# Patient Record
Sex: FEMALE | Race: WHITE | Employment: FULL TIME | URBAN - METROPOLITAN AREA
[De-identification: names, ages, dates, MRNs, and addresses within clinical notes are randomized per-mention and may not be internally consistent; named-entity substitution may affect disease eponyms.]

---

## 2017-02-03 DIAGNOSIS — N95.1 SYMPTOMATIC MENOPAUSAL OR FEMALE CLIMACTERIC STATES: Primary | ICD-10-CM

## 2017-02-03 RX ORDER — ESTRADIOL 0.5 MG/1
TABLET ORAL
Qty: 30 TABLET | Refills: 0 | Status: SHIPPED | OUTPATIENT
Start: 2017-02-03 | End: 2017-05-31

## 2017-02-03 NOTE — TELEPHONE ENCOUNTER
Estradiol 0.5mg      Last Written Prescription Date:  11/4/16  Last Fill Quantity: 90,   # refills: 1  Last Office Visit with [facility] primary care provider:  6/19/15  Future Office visit: none    Pt overdue for annual, no appt scheduled One month extension sent.

## 2017-05-22 DIAGNOSIS — N95.1 SYMPTOMATIC MENOPAUSAL OR FEMALE CLIMACTERIC STATES: ICD-10-CM

## 2017-05-22 RX ORDER — ESTRADIOL 0.5 MG/1
TABLET ORAL
Qty: 180 TABLET | Refills: 0 | OUTPATIENT
Start: 2017-05-22

## 2017-05-22 NOTE — TELEPHONE ENCOUNTER
Estradiol 0.5mg      Last Written Prescription Date:  2/3/17  Last Fill Quantity: 30,   # refills: 0  Last Office Visit with [facility] primary care provider:  6/19/15  Future Office visit: none    Pt well overdue for annual, Rx denied.

## 2017-06-13 ENCOUNTER — RADIANT APPOINTMENT (OUTPATIENT)
Dept: MAMMOGRAPHY | Facility: CLINIC | Age: 57
End: 2017-06-13
Payer: COMMERCIAL

## 2017-06-13 ENCOUNTER — OFFICE VISIT (OUTPATIENT)
Dept: OBGYN | Facility: CLINIC | Age: 57
End: 2017-06-13
Payer: COMMERCIAL

## 2017-06-13 ENCOUNTER — RESULT FOLLOW UP (OUTPATIENT)
Dept: OBGYN | Facility: CLINIC | Age: 57
End: 2017-06-13

## 2017-06-13 VITALS
SYSTOLIC BLOOD PRESSURE: 118 MMHG | BODY MASS INDEX: 40.32 KG/M2 | DIASTOLIC BLOOD PRESSURE: 76 MMHG | WEIGHT: 242 LBS | HEART RATE: 72 BPM | HEIGHT: 65 IN

## 2017-06-13 DIAGNOSIS — N95.1 SYMPTOMATIC MENOPAUSAL OR FEMALE CLIMACTERIC STATES: ICD-10-CM

## 2017-06-13 DIAGNOSIS — R87.810 CERVICAL HIGH RISK HPV (HUMAN PAPILLOMAVIRUS) TEST POSITIVE: ICD-10-CM

## 2017-06-13 DIAGNOSIS — Z12.31 VISIT FOR SCREENING MAMMOGRAM: ICD-10-CM

## 2017-06-13 DIAGNOSIS — Z01.419 ENCOUNTER FOR GYNECOLOGICAL EXAMINATION WITHOUT ABNORMAL FINDING: Primary | ICD-10-CM

## 2017-06-13 DIAGNOSIS — Z11.51 SCREENING FOR HUMAN PAPILLOMAVIRUS: ICD-10-CM

## 2017-06-13 PROCEDURE — G0202 SCR MAMMO BI INCL CAD: HCPCS | Mod: TC

## 2017-06-13 PROCEDURE — 77063 BREAST TOMOSYNTHESIS BI: CPT | Mod: TC

## 2017-06-13 PROCEDURE — 87624 HPV HI-RISK TYP POOLED RSLT: CPT | Performed by: OBSTETRICS & GYNECOLOGY

## 2017-06-13 PROCEDURE — 99396 PREV VISIT EST AGE 40-64: CPT | Performed by: OBSTETRICS & GYNECOLOGY

## 2017-06-13 PROCEDURE — G0145 SCR C/V CYTO,THINLAYER,RESCR: HCPCS | Performed by: OBSTETRICS & GYNECOLOGY

## 2017-06-13 RX ORDER — ESTRADIOL 0.5 MG/1
TABLET ORAL
Qty: 180 TABLET | Refills: 3 | Status: SHIPPED | OUTPATIENT
Start: 2017-06-13 | End: 2018-06-11

## 2017-06-13 RX ORDER — LOSARTAN POTASSIUM 25 MG/1
50 TABLET ORAL
COMMUNITY
Start: 2017-02-13 | End: 2020-11-25

## 2017-06-13 ASSESSMENT — ANXIETY QUESTIONNAIRES
IF YOU CHECKED OFF ANY PROBLEMS ON THIS QUESTIONNAIRE, HOW DIFFICULT HAVE THESE PROBLEMS MADE IT FOR YOU TO DO YOUR WORK, TAKE CARE OF THINGS AT HOME, OR GET ALONG WITH OTHER PEOPLE: NOT DIFFICULT AT ALL
6. BECOMING EASILY ANNOYED OR IRRITABLE: NOT AT ALL
3. WORRYING TOO MUCH ABOUT DIFFERENT THINGS: NOT AT ALL
GAD7 TOTAL SCORE: 0
5. BEING SO RESTLESS THAT IT IS HARD TO SIT STILL: NOT AT ALL
1. FEELING NERVOUS, ANXIOUS, OR ON EDGE: NOT AT ALL
7. FEELING AFRAID AS IF SOMETHING AWFUL MIGHT HAPPEN: NOT AT ALL
2. NOT BEING ABLE TO STOP OR CONTROL WORRYING: NOT AT ALL

## 2017-06-13 ASSESSMENT — PATIENT HEALTH QUESTIONNAIRE - PHQ9: 5. POOR APPETITE OR OVEREATING: NOT AT ALL

## 2017-06-13 NOTE — LETTER
May 31, 2018      Anyi Jorje  [address]  [place] [address]    Dear ,      At [facility], your health and wellness is our primary concern. That is why we are following up on a positive high risk HPV test from 06/13/17. Your provider had recommended that you have a Pap smear and HPV test completed by 06/13/18. Our records do not show that this has been scheduled.    It is important to complete the follow up that your provider has suggested for you to ensure that there are no worsening changes which may, over time, develop into cancer.      Please contact our office at  [phone] to schedule an appointment for a Pap smear and HPV test at your earliest convenience. If you have questions or concerns, please call the clinic and we will be happy to assist you.    If you have completed the tests outside of [facility], please have the results forwarded to our office. We will update the chart for your primary Physician to review before your next annual physical.     Thank you for choosing [facility]!    Sincerely,      Augustus Stark MD/[facility]

## 2017-06-13 NOTE — LETTER
May 29, 2019      Anyi Recinos  [address]  [place] [address]    Dear ,      At [facility], your health and wellness is our primary concern. That is why we are following up on a positive high risk HPV test from 06/11/18. Your provider had recommended that you have a Pap smear and HPV test completed by 06/11/19. Our records do not show that this has been scheduled.    It is important to complete the follow up that your provider has suggested for you to ensure that there are no worsening changes which may, over time, develop into cancer.      Please contact our office at  [phone] to schedule an appointment for a Pap smear and HPV test at your earliest convenience. If you have questions or concerns, please call the clinic and we will be happy to assist you.    If you have completed the tests outside of [facility], please have the results forwarded to our office. We will update the chart for your primary Physician to review before your next annual physical.     Thank you for choosing [facility]!    Sincerely,      Your [facility] Care Team/[facility]

## 2017-06-13 NOTE — PROGRESS NOTES
Anyi is a 57 year old No obstetric history on file. female who presents for annual exam.     Besides routine health maintenance,  she would like refill on Estradiol.    HPI:  The patient's PCP is UMA BHAKTA.  Patient is seen for her annual exam.  She has no gynecologic concerns.  She's had some recent nosebleeds.  She is not taking any potential blood thinners.  Her blood pressure has been normal.      GYNECOLOGIC HISTORY:    Patient's last menstrual period was 09/17/2007.  Her current contraception method is: menopause.  She  reports that she quit smoking about 35 years ago. She has a 3.00 pack-year smoking history. She has never used smokeless tobacco.    Patient is sexually active.  STD testing offered?  Declined  Last PHQ-9 score on record =   PHQ-9 SCORE 6/13/2017   Total Score 2     Last GAD7 score on record =   HUEY-7 SCORE 6/13/2017   Total Score 0     Alcohol Score = 1    HEALTH MAINTENANCE:  Cholesterol: (No results found for: CHOL   Last Mammo: last record was 2014, Result: normal, Next Mammo: today   Pap: (  Lab Results   Component Value Date    PAP NIL 06/19/2015    )   Colonoscopy:  2013, Result: normal, Next Colonoscopy: 6 years.  Dexa:  NA    Health maintenance updated:  yes    HISTORY:  Obstetric History     No data available          Patient Active Problem List   Diagnosis     Prolapse of female pelvic organs     Cyst of left breast     Vaginal enterocele     Cystocele, midline     Routine general medical examination at a health care facility     Vaginal prolapse     Past Surgical History:   Procedure Laterality Date     COLPORRHAPHY ANTERIOR, POSTERIOR, COMBINED  9/17/2013    Procedure: COMBINED COLPORRHAPHY ANTERIOR, POSTERIOR;  ANTERIOR AND POSTERIOR REPAIR, PERINEOPLASTY, MINI ARC SLING (DR. HOBSON), DIAGNOSTIC LAPAROSCOPY AND LAPAROSCOPIC CHOLECYSTECTOMY (DR. ANDREW) ;  Surgeon: Augustus Hobson MD;  Location:  OR     COSMETIC SURGERY  2013    abdominoplasty     [facility]  SACROCOLPOPEXY, MIDURETHRAL SLING, CYSTOSCOPY N/A 5/11/2016    Procedure: DAVINCI SACROCOLPOPEXY, MIDURETHRAL SLING, CYSTOSCOPY;  Surgeon: Don Abdullahi MD;  Location:  OR     GI SURGERY  2007    lap band     GYN SURGERY  2007    vag hysterectomy     GYN SURGERY  1988,1990    c section     LAPAROSCOPIC CHOLECYSTECTOMY  9/17/2013    Procedure: LAPAROSCOPIC CHOLECYSTECTOMY;;  Surgeon: Mckinley Liu MD;  Location:  OR     LAPAROSCOPY DIAGNOSTIC (GENERAL)  9/17/2013    Procedure: LAPAROSCOPY DIAGNOSTIC (GENERAL);;  Surgeon: Mckinley Liu MD;  Location:  OR     LAPAROSCOPY, SURGICAL; REPAIR INCISIONAL OR VENTRAL HERNIA       MAMMOPLASTY REDUCTION BILATERAL       MASTOPEXY BILATERAL       SLING TRANSVAGINAL  9/17/2013    Procedure: SLING TRANSVAGINAL;;  Surgeon: Augustus Stark MD;  Location:  OR      Social History   Substance Use Topics     Smoking status: Former Smoker     Packs/day: 0.50     Years: 6.00     Quit date: 1/12/1982     Smokeless tobacco: Never Used     Alcohol use Yes      Comment: rare      Problem (# of Occurrences) Relation (Name,Age of Onset)    GASTROINTESTINAL DISEASE (1) Father    HEART DISEASE (1) Father            Current Outpatient Prescriptions   Medication Sig     losartan (COZAAR) 25 MG tablet Take 25 mg by mouth     estradiol (ESTRACE) 0.5 MG tablet TAKE 2 TABLETS BY MOUTH EVERY EVENING.     Probiotic Product (PROBIOTIC PO) Take 1 tablet by mouth daily     [DISCONTINUED] estradiol (ESTRACE) 0.5 MG tablet TAKE 2 TABLETS BY MOUTH EVERY EVENING.     No current facility-administered medications for this visit.      No Known Allergies    Past medical, surgical, social and family histories were reviewed and updated in EPIC.    ROS:   12 point review of systems negative other than symptoms noted below.  Constitutional: Fatigue  Eyes: Spots  Cardiovascular: Lower Extremity Swelling  Gastrointestinal: Bloating and Heartburn  Musculoskeletal: Joint  "Pain  Endocrine: Loss of Hair  Blood/Lymph: Nose Bleeds    EXAM:  /76  Pulse 72  Ht 5' 5\" (1.651 m)  Wt 242 lb (109.8 kg)  LMP 09/17/2007  Breastfeeding? No  BMI 40.27 kg/m2   BMI: Body mass index is 40.27 kg/(m^2).    PHYSICAL EXAM:  Constitutional:  Appearance: Well nourished, well developed, alert, in no acute distress  Neck:  Lymph Nodes:  No lymphadenopathy present    Thyroid:  Gland size normal, nontender, no nodules or masses present  on palpation  Chest:  Respiratory Effort:  Breathing unlabored  Cardiovascular:    Heart: Auscultation:  Regular rate, normal rhythm, no murmurs present  Breasts: Inspection of Breasts:  No lymphadenopathy present    Palpation of Breasts and Axillae:  No masses present on palpation, no  breast tenderness    Axillary Lymph Nodes:  No lymphadenopathy present  Gastrointestinal:   Abdominal Examination:  Abdomen nontender to palpation, tone normal without rigidity or guarding, no masses present, umbilicus without lesions   Liver and Spleen:  No hepatomegaly present, liver nontender to palpation    Hernias:  No hernias present  Lymphatic: Lymph Nodes:  No other lymphadenopathy present  Skin:  General Inspection:  No rashes present, no lesions present, no areas of  discoloration    Genitalia and Groin:  No rashes present, no lesions present, no areas of  discoloration, no masses present  Neurologic/Psychiatric:    Mental Status:  Oriented X3     Pelvic Exam:  External Genitalia:     Normal appearance for age, no discharge present, no tenderness present, no inflammatory lesions present, color normal  Vagina:     Normal vaginal vault without central or paravaginal defects, no discharge present, no inflammatory lesions present, no masses present  Bladder:     Nontender to palpation  Urethra:   Urethral Body:  Urethra palpation normal, urethra structural support normal   Urethral Meatus:  No erythema or lesions present  Cervix:     Surgically absent  Uterus:     Surgically " absent  Adnexa:     Surgically absent  Perineum:     Perineum within normal limits, no evidence of trauma, no rashes or skin lesions present  Anus:     Anus within normal limits, no hemorrhoids present  Inguinal Lymph Nodes:     No lymphadenopathy present    COUNSELING:   Reviewed preventive health counseling, as reflected in patient instructions       Regular exercise       Healthy diet/nutrition    BMI: Body mass index is 40.27 kg/(m^2).  Weight management plan: Patient was referred to their PCP to discuss a diet and exercise plan.    ASSESSMENT:  57 year old female with satisfactory annual exam.    ICD-10-CM    1. Encounter for gynecological examination without abnormal finding Z01.419 Pap imaged thin layer screen reflex to HPV if ASCUS - recommended age 25 - 29 years   2. Symptomatic menopausal or female climacteric states N95.1 estradiol (ESTRACE) 0.5 MG tablet       PLAN:  The patient will return to clinic in 1 year or as needed.    Augustus Stark MD

## 2017-06-13 NOTE — LETTER
June 21, 2017      Anyi Arellanoner  [address] [place] [address]    Dear ner,      This letter is in regards to the PAP smear and HPV (Human Papillomavirus) test you had done recently. Your PAP test result is normal, but your HPV (Human Papillomavirus) test was positive.     About 80 percent of women have been exposed to HPV virus throughout their lifetime. There is no medication for the treatment of HPV. Typically your own immune system gets rid of the virus before it does harm. HPV is spread by direct skin-to-skin contact, including sexual intercourse, oral sex, anal sex, or any other contact involving the genital area (example: hand to genital contact). It is not possible to become infected with HPV by touching an object, such as a toilet seat. Most people who are infected with HPV have no signs or symptoms.    Things that you can do to boost your immune system and help your body get rid of HPV: get plenty of rest, eat a well-balanced diet of healthy foods, and stop smoking.     Please return in 1 year to repeat your pap smear and HPV test.     If you have additional questions regarding this result, please call [phone].    Sincerely,      Augustus Stark MD/[facility]

## 2017-06-13 NOTE — MR AVS SNAPSHOT
"              After Visit Summary   2017    Anyi Recinos    MRN: [id number]           Patient Information     Date Of Birth          1960        Visit Information        Provider Department      2017 1:15 PM Augustus Stark MD [facility]        Today's Diagnoses     Encounter for gynecological examination without abnormal finding    -  1    Symptomatic menopausal or female climacteric states           Follow-ups after your visit        Who to contact     If you have questions or need follow up information about today's clinic visit or your schedule please contact [facility] directly at [phone].  Normal or non-critical lab and imaging results will be communicated to you by MyChart, letter or phone within 4 business days after the clinic has received the results. If you do not hear from us within 7 days, please contact the clinic through [employer] or phone. If you have a critical or abnormal lab result, we will notify you by phone as soon as possible.  Submit refill requests through [employer] or call your pharmacy and they will forward the refill request to us. Please allow 3 business days for your refill to be completed.          Additional Information About Your Visit        MyChart Information     [employer] lets you send messages to your doctor, view your test results, renew your prescriptions, schedule appointments and more. To sign up, go to [email or web address] . Click on \"Log in\" on the left side of the screen, which will take you to the Welcome page. Then click on \"Sign up Now\" on the right side of the page.     You will be asked to enter the access code listed below, as well as some personal information. Please follow the directions to create your username and password.     Your access code is: [email or web address]  Expires: 2017  1:50 PM     Your access code will  in 90 days. If you need help or a new code, please call your [facility] clinic " "or [phone].        Care EveryWhere ID     This is your Care EveryWhere ID. This could be used by other organizations to access your [facility] medical records  [id number]        Your Vitals Were     Pulse Height Last Period Breastfeeding? BMI (Body Mass Index)       72 5' 5\" (1.651 m) 09/17/2007 No 40.27 kg/m2        Blood Pressure from Last 3 Encounters:   06/13/17 118/76   12/13/16 128/80   05/12/16 112/59    Weight from Last 3 Encounters:   06/13/17 242 lb (109.8 kg)   12/13/16 242 lb 9.6 oz (110 kg)   05/11/16 238 lb 11.2 oz (108.3 kg)              We Performed the Following     Pap imaged thin layer screen reflex to HPV if ASCUS - recommended age 25 - 29 years          Today's Medication Changes          These changes are accurate as of: 6/13/17  1:51 PM.  If you have any questions, ask your nurse or doctor.               These medicines have changed or have updated prescriptions.        Dose/Directions    estradiol 0.5 MG tablet   Commonly known as:  ESTRACE   This may have changed:  See the new instructions.   Used for:  Symptomatic menopausal or female climacteric states   Changed by:  Augustus Stark MD        TAKE 2 TABLETS BY MOUTH EVERY EVENING.   Quantity:  180 tablet   Refills:  3            Where to get your medicines      These medications were sent to [id number] [place] [facility] [place] [place] - [address] [facility] [address]  [address] [address] [place] [address]     Phone:  [phone]     estradiol 0.5 MG tablet                Primary Care Provider Office Phone # Fax #    Benedict ANA MARÍA Lenz [phone]       [facility] [address]  [place] [address]        Thank you!     Thank you for choosing [facility]  for your care. Our goal is always to provide you with excellent care. Hearing back from our patients is one way we can continue to improve our services. Please take a few " minutes to complete the written survey that you may receive in the mail after your visit with us. Thank you!             Your Updated Medication List - Protect others around you: Learn how to safely use, store and throw away your medicines at www.disposemymeds.org.          This list is accurate as of: 6/13/17  1:51 PM.  Always use your most recent med list.                   Brand Name Dispense Instructions for use    estradiol 0.5 MG tablet    ESTRACE    180 tablet    TAKE 2 TABLETS BY MOUTH EVERY EVENING.       losartan 25 MG tablet    COZAAR     Take 25 mg by mouth       PROBIOTIC PO      Take 1 tablet by mouth daily

## 2017-06-14 ASSESSMENT — PATIENT HEALTH QUESTIONNAIRE - PHQ9: SUM OF ALL RESPONSES TO PHQ QUESTIONS 1-9: 2

## 2017-06-14 ASSESSMENT — ANXIETY QUESTIONNAIRES: GAD7 TOTAL SCORE: 0

## 2017-06-15 LAB
COPATH REPORT: NORMAL
PAP: NORMAL

## 2017-06-19 LAB
FINAL DIAGNOSIS: ABNORMAL
HPV HR 12 DNA CVX QL NAA+PROBE: POSITIVE
HPV16 DNA SPEC QL NAA+PROBE: NEGATIVE
HPV18 DNA SPEC QL NAA+PROBE: NEGATIVE
SPECIMEN DESCRIPTION: ABNORMAL

## 2017-06-20 PROBLEM — R87.810 CERVICAL HIGH RISK HPV (HUMAN PAPILLOMAVIRUS) TEST POSITIVE: Status: ACTIVE | Noted: 2017-06-13

## 2017-06-20 NOTE — PROGRESS NOTES
06/13/17: NIL vag pap, + HR HPV (not 16 or 18) result. Plan provider review. Pt had a hysterectomy. Plan for 1 yr pap, due 6/2018 (LN)  6/21/17 Result letter sent per RN request (rlm)  05/31/18 Pap reminder letter sent. ([facility])  6/11/18 NIL vaginal pap/+ HR HPV (not 16 or 18). Plan: cotest in 1 year  05/29/19 Cotest reminder letter sent. ([facility])  07/05/19 OV notes: Long discussion about HPV. Pt technically would not have pap of vagina after hysterectomy. Now with pos HPV but 3 negative paps. Discussed low risk of vaginal cancer. Possible resolution of HPV over time. Will have pap and HPV repeated in 3-4 years. If neg for HPV will stop all paps. ([facility])

## 2018-06-11 ENCOUNTER — RADIANT APPOINTMENT (OUTPATIENT)
Dept: MAMMOGRAPHY | Facility: CLINIC | Age: 58
End: 2018-06-11
Payer: COMMERCIAL

## 2018-06-11 ENCOUNTER — OFFICE VISIT (OUTPATIENT)
Dept: OBGYN | Facility: CLINIC | Age: 58
End: 2018-06-11
Payer: COMMERCIAL

## 2018-06-11 VITALS
SYSTOLIC BLOOD PRESSURE: 114 MMHG | HEART RATE: 68 BPM | WEIGHT: 245 LBS | HEIGHT: 65 IN | DIASTOLIC BLOOD PRESSURE: 68 MMHG | BODY MASS INDEX: 40.82 KG/M2

## 2018-06-11 DIAGNOSIS — Z12.31 VISIT FOR SCREENING MAMMOGRAM: ICD-10-CM

## 2018-06-11 DIAGNOSIS — N81.6 RECTOCELE: ICD-10-CM

## 2018-06-11 DIAGNOSIS — N95.1 SYMPTOMATIC MENOPAUSAL OR FEMALE CLIMACTERIC STATES: ICD-10-CM

## 2018-06-11 DIAGNOSIS — B97.7 HIGH RISK HPV INFECTION: ICD-10-CM

## 2018-06-11 DIAGNOSIS — Z01.419 ENCOUNTER FOR GYNECOLOGICAL EXAMINATION WITHOUT ABNORMAL FINDING: Primary | ICD-10-CM

## 2018-06-11 PROCEDURE — 99396 PREV VISIT EST AGE 40-64: CPT | Performed by: OBSTETRICS & GYNECOLOGY

## 2018-06-11 PROCEDURE — 87624 HPV HI-RISK TYP POOLED RSLT: CPT | Performed by: OBSTETRICS & GYNECOLOGY

## 2018-06-11 PROCEDURE — 88175 CYTOPATH C/V AUTO FLUID REDO: CPT | Performed by: OBSTETRICS & GYNECOLOGY

## 2018-06-11 PROCEDURE — 77067 SCR MAMMO BI INCL CAD: CPT | Mod: TC

## 2018-06-11 PROCEDURE — 77063 BREAST TOMOSYNTHESIS BI: CPT | Mod: TC

## 2018-06-11 RX ORDER — ESTRADIOL 0.5 MG/1
TABLET ORAL
Qty: 180 TABLET | Refills: 3 | Status: SHIPPED | OUTPATIENT
Start: 2018-06-11 | End: 2019-06-19

## 2018-06-11 ASSESSMENT — ANXIETY QUESTIONNAIRES
2. NOT BEING ABLE TO STOP OR CONTROL WORRYING: NOT AT ALL
3. WORRYING TOO MUCH ABOUT DIFFERENT THINGS: NOT AT ALL
6. BECOMING EASILY ANNOYED OR IRRITABLE: NOT AT ALL
1. FEELING NERVOUS, ANXIOUS, OR ON EDGE: NOT AT ALL
5. BEING SO RESTLESS THAT IT IS HARD TO SIT STILL: NOT AT ALL
IF YOU CHECKED OFF ANY PROBLEMS ON THIS QUESTIONNAIRE, HOW DIFFICULT HAVE THESE PROBLEMS MADE IT FOR YOU TO DO YOUR WORK, TAKE CARE OF THINGS AT HOME, OR GET ALONG WITH OTHER PEOPLE: NOT DIFFICULT AT ALL

## 2018-06-11 ASSESSMENT — PATIENT HEALTH QUESTIONNAIRE - PHQ9: 5. POOR APPETITE OR OVEREATING: NOT AT ALL

## 2018-06-11 NOTE — PROGRESS NOTES
Anyi is a 58 year old No obstetric history on file. female who presents for annual exam.     Besides routine health maintenance, she has no other health concerns today .    HPI:  The patient's PCP is  UMA BHAKTA.      Patient is seen for her annual exam.  She has some questions about her positive HPV viral Pap smear last year.  It was positive for other HPV viruses not 16 or 18.      GYNECOLOGIC HISTORY:    Patient's last menstrual period was 09/17/2007.  Her current contraception method is: hysterectomy.  She  reports that she quit smoking about 36 years ago. She has a 3.00 pack-year smoking history. She has never used smokeless tobacco.    Patient is sexually active.  STD testing offered?  Declined  Last PHQ-9 score on record =   PHQ-9 SCORE 6/11/2018   Total Score 1     Last GAD7 score on record =   HUEY-7 SCORE 6/13/2017   Total Score 0     Alcohol Score = 1 or 2    HEALTH MAINTENANCE:  Cholesterol: (No results found for: CHOL   Last Mammo: one year ago, Result: normal, Next Mammo: today  Pap: 6/13/17 NIL HPV OTHER HR POSITIVE   Lab Results   Component Value Date    PAP NIL 06/13/2017    PAP NIL 06/19/2015      Colonoscopy:  2006*, Result: normal, Next Colonoscopy: 10years.  Dexa:  Never     Health maintenance updated:  yes    HISTORY:  Obstetric History     No data available          Patient Active Problem List   Diagnosis     Prolapse of female pelvic organs     Cyst of left breast     Vaginal enterocele     Cystocele, midline     Routine general medical examination at a health care facility     Vaginal prolapse     Cervical high risk HPV (human papillomavirus) test positive     Past Surgical History:   Procedure Laterality Date     COLPORRHAPHY ANTERIOR, POSTERIOR, COMBINED  9/17/2013    Procedure: COMBINED COLPORRHAPHY ANTERIOR, POSTERIOR;  ANTERIOR AND POSTERIOR REPAIR, PERINEOPLASTY, MINI ARC SLING (DR. HOBSON), DIAGNOSTIC LAPAROSCOPY AND LAPAROSCOPIC CHOLECYSTECTOMY (DR. ANDREW) ;  Surgeon:  Augustus Stark MD;  Location:  OR     COSMETIC SURGERY  2013    abdominoplasty     DAVINCI SACROCOLPOPEXY, MIDURETHRAL SLING, CYSTOSCOPY N/A 5/11/2016    Procedure: DAVINCI SACROCOLPOPEXY, MIDURETHRAL SLING, CYSTOSCOPY;  Surgeon: Don Abdullahi MD;  Location:  OR     GI SURGERY  2007    lap band     GYN SURGERY  2007    vag hysterectomy     GYN SURGERY  1988,1990    c section     LAPAROSCOPIC CHOLECYSTECTOMY  9/17/2013    Procedure: LAPAROSCOPIC CHOLECYSTECTOMY;;  Surgeon: Mckinley Liu MD;  Location:  OR     LAPAROSCOPY DIAGNOSTIC (GENERAL)  9/17/2013    Procedure: LAPAROSCOPY DIAGNOSTIC (GENERAL);;  Surgeon: Mckinley Liu MD;  Location:  OR     LAPAROSCOPY, SURGICAL; REPAIR INCISIONAL OR VENTRAL HERNIA       MAMMOPLASTY REDUCTION BILATERAL       MASTOPEXY BILATERAL       SLING TRANSVAGINAL  9/17/2013    Procedure: SLING TRANSVAGINAL;;  Surgeon: Augustus Stark MD;  Location:  OR      Social History   Substance Use Topics     Smoking status: Former Smoker     Packs/day: 0.50     Years: 6.00     Quit date: 1/12/1982     Smokeless tobacco: Never Used     Alcohol use Yes      Comment: rare      Problem (# of Occurrences) Relation (Name,Age of Onset)    GASTROINTESTINAL DISEASE (1) Father    HEART DISEASE (1) Father            Current Outpatient Prescriptions   Medication Sig     estradiol (ESTRACE) 0.5 MG tablet TAKE 2 TABLETS BY MOUTH EVERY EVENING.     losartan (COZAAR) 25 MG tablet Take 25 mg by mouth     Probiotic Product (PROBIOTIC PO) Take 1 tablet by mouth daily     [DISCONTINUED] estradiol (ESTRACE) 0.5 MG tablet TAKE 2 TABLETS BY MOUTH EVERY EVENING.     No current facility-administered medications for this visit.      No Known Allergies    Past medical, surgical, social and family histories were reviewed and updated in EPIC.    ROS:   12 point review of systems negative other than symptoms noted below.  Constitutional: Fatigue and Weight Gain  Eyes:  "Spots  Cardiovascular: Lightheadedness  Gastrointestinal: Bloating and Heartburn  Skin: Skin Dryness  Musculoskeletal: Joint Pain  Blood/Lymph: Lymph Node Enlargement    EXAM:  /68  Pulse 68  Ht 5' 5\" (1.651 m)  Wt 245 lb (111.1 kg)  LMP 09/17/2007  BMI 40.77 kg/m2   BMI: Body mass index is 40.77 kg/(m^2).    PHYSICAL EXAM:  Constitutional:  Appearance: Well nourished, well developed, alert, in no acute distress  Neck:  Lymph Nodes:  No lymphadenopathy present    Thyroid:  Gland size normal, nontender, no nodules or masses present  on palpation  Chest:  Respiratory Effort:  Breathing unlabored  Cardiovascular:    Heart: Auscultation:  Regular rate, normal rhythm, no murmurs present  Breasts: Inspection of Breasts:  No lymphadenopathy present., Palpation of Breasts and Axillae:  No masses present on palpation, no breast tenderness., Axillary Lymph Nodes:  No lymphadenopathy present. and No nodularity, asymmetry or nipple discharge bilaterally.  Gastrointestinal:   Abdominal Examination:  Abdomen nontender to palpation, tone normal without rigidity or guarding, no masses present, umbilicus without lesions   Liver and Spleen:  No hepatomegaly present, liver nontender to palpation    Hernias:  No hernias present  Lymphatic: Lymph Nodes:  No other lymphadenopathy present  Skin:  General Inspection:  No rashes present, no lesions present, no areas of  discoloration    Genitalia and Groin:  No rashes present, no lesions present, no areas of  discoloration, no masses present  Neurologic/Psychiatric:    Mental Status:  Oriented X3     Pelvic Exam:  External Genitalia:     Normal appearance for age, no discharge present, no tenderness present, no inflammatory lesions present, color normal  Vagina:     Grade 1-2 rectocele good apical support  Bladder:     Nontender to palpation  Urethra:   Urethral Body:  Urethra palpation normal, urethra structural support normal   Urethral Meatus:  No erythema or lesions " present  Cervix:     Surgically absent  Uterus:     Surgically absent  Adnexa:     No adnexal tenderness present, no adnexal masses present  Perineum:     Perineum within normal limits, no evidence of trauma, no rashes or skin lesions present  Anus:     Anus within normal limits, no hemorrhoids present  Inguinal Lymph Nodes:     No lymphadenopathy present  Pubic Hair:     Normal pubic hair distribution for age  Genitalia and Groin:     No rashes present, no lesions present, no areas of discoloration, no masses present      COUNSELING:   Reviewed preventive health counseling, as reflected in patient instructions       Regular exercise       Healthy diet/nutrition    BMI: Body mass index is 40.77 kg/(m^2).  Weight management plan: Patient was referred to their PCP to discuss a diet and exercise plan.    ASSESSMENT:  58 year old female with satisfactory annual exam.    ICD-10-CM    1. Encounter for gynecological examination without abnormal finding Z01.419 Pap imaged thin layer screen with HPV - recommended age 30 - 65     HPV High Risk Types DNA Cervical   2. Symptomatic menopausal or female climacteric states N95.1 estradiol (ESTRACE) 0.5 MG tablet   3. Rectocele N81.6    4. High risk HPV infection B97.7     Not 16 or 18       PLAN:  Patient be notified as to her Pap results and HPV status.  If her Pap is normal and her HPV is again positive we will repeat her Pap smear and CLOtest in 1 year.  If her Pap is atypical we will schedule colposcopy.    Augustus Stark MD

## 2018-06-11 NOTE — LETTER
June 18, 2018      Anyi Arellanoner  [address] [facility] [place] [address]    Dear ner,      This letter is in regards to the PAP smear and HPV (Human Papillomavirus) test you had done recently. Your PAP test result is normal, but your HPV (Human Papillomavirus) test was positive.     About 80 percent of women have been exposed to HPV virus throughout their lifetime. There is no medication for the treatment of HPV. Typically your own immune system gets rid of the virus before it does harm. HPV is spread by direct skin-to-skin contact, including sexual intercourse, oral sex, anal sex, or any other contact involving the genital area (example: hand to genital contact). It is not possible to become infected with HPV by touching an object, such as a toilet seat. Most people who are infected with HPV have no signs or symptoms.    Things that you can do to boost your immune system and help your body get rid of HPV: get plenty of rest, eat a well-balanced diet of healthy foods, and stop smoking.     Please return in 1 year to repeat your pap smear and HPV test.     If you have additional questions regarding this result, please call [phone].    Sincerely,      Augustus Stark MD/emily

## 2018-06-11 NOTE — MR AVS SNAPSHOT
"              After Visit Summary   2018    Anyi Recinos    MRN: [id number]           Patient Information     Date Of Birth          1960        Visit Information        Provider Department      2018 1:30 PM Augustus Stark MD [facility]        Today's Diagnoses     Encounter for gynecological examination without abnormal finding    -  1    Symptomatic menopausal or female climacteric states        Rectocele        High risk HPV infection           Follow-ups after your visit        Who to contact     If you have questions or need follow up information about today's clinic visit or your schedule please contact [facility] directly at [phone].  Normal or non-critical lab and imaging results will be communicated to you by MyChart, letter or phone within 4 business days after the clinic has received the results. If you do not hear from us within 7 days, please contact the clinic through Pigeonly[facility] or phone. If you have a critical or abnormal lab result, we will notify you by phone as soon as possible.  Submit refill requests through 8digits or call your pharmacy and they will forward the refill request to us. Please allow 3 business days for your refill to be completed.          Additional Information About Your Visit        MyChart Information     8digits lets you send messages to your doctor, view your test results, renew your prescriptions, schedule appointments and more. To sign up, go to [email or web address] . Click on \"Log in\" on the left side of the screen, which will take you to the Welcome page. Then click on \"Sign up Now\" on the right side of the page.     You will be asked to enter the access code listed below, as well as some personal information. Please follow the directions to create your username and password.     Your access code is: [email or web address]  Expires: 2018  1:05 PM     Your access code will  in 90 days. If you need help " "or a new code, please call your [facility] clinic or [phone].        Care EveryWhere ID     This is your Care EveryWhere ID. This could be used by other organizations to access your [facility] medical records  [id number]        Your Vitals Were     Pulse Height Last Period BMI (Body Mass Index)          68 5' 5\" (1.651 m) 09/17/2007 40.77 kg/m2         Blood Pressure from Last 3 Encounters:   06/11/18 114/68   06/13/17 118/76   12/13/16 128/80    Weight from Last 3 Encounters:   06/11/18 245 lb (111.1 kg)   06/13/17 242 lb (109.8 kg)   12/13/16 242 lb 9.6 oz (110 kg)              We Performed the Following     HPV High Risk Types DNA Cervical     Pap imaged thin layer screen with HPV - recommended age 30 - 65          Where to get your medicines      These medications were sent to [facility] - [place], [place] - [address] AT [facility] OF [facility] [address]  [address] [facility], [facility] [address]     Phone:  [phone]     estradiol 0.5 MG tablet          Primary Care Provider Office Phone # Fax #    Benedict Lenz [phone]       [facility] [address]  [facility] [address]        Equal Access to Services     BHAVESH HYLTON AH: Hadii aad ku hadasho Soomaali, waaxda luqadaha, qaybta kaalmada adeegyada, luciano huerta. So [facility] [phone].    ATENCIÓN: Si habla español, tiene a haddad disposición servicios gratuitos de asistencia lingüística. Ariella gomez [phone].    We comply with applicable federal civil rights laws and [place] laws. We do not discriminate on the basis of race, color, national origin, age, disability, sex, sexual orientation, or gender identity.            Thank you!     Thank you for choosing [facility]  for your care. Our goal is always to provide you with excellent care. Hearing back from our patients is one way we can continue to improve our services. Please " take a few minutes to complete the written survey that you may receive in the mail after your visit with us. Thank you!             Your Updated Medication List - Protect others around you: Learn how to safely use, store and throw away your medicines at www.disposemymeds.org.          This list is accurate as of 6/11/18  2:05 PM.  Always use your most recent med list.                   Brand Name Dispense Instructions for use Diagnosis    estradiol 0.5 MG tablet    ESTRACE    180 tablet    TAKE 2 TABLETS BY MOUTH EVERY EVENING.    Symptomatic menopausal or female climacteric states       losartan 25 MG tablet    COZAAR     Take 25 mg by mouth        PROBIOTIC PO      Take 1 tablet by mouth daily

## 2018-06-12 ASSESSMENT — PATIENT HEALTH QUESTIONNAIRE - PHQ9: SUM OF ALL RESPONSES TO PHQ QUESTIONS 1-9: 1

## 2018-06-13 LAB
COPATH REPORT: NORMAL
PAP: NORMAL

## 2018-06-15 PROBLEM — R87.811 VAGINAL HIGH RISK HUMAN PAPILLOMAVIRUS (HPV) DNA TEST POSITIVE: Status: ACTIVE | Noted: 2017-06-13

## 2018-06-15 LAB
FINAL DIAGNOSIS: ABNORMAL
HPV HR 12 DNA CVX QL NAA+PROBE: POSITIVE
HPV16 DNA SPEC QL NAA+PROBE: NEGATIVE
HPV18 DNA SPEC QL NAA+PROBE: NEGATIVE
SPECIMEN DESCRIPTION: ABNORMAL
SPECIMEN SOURCE CVX/VAG CYTO: ABNORMAL

## 2019-06-19 DIAGNOSIS — N95.1 SYMPTOMATIC MENOPAUSAL OR FEMALE CLIMACTERIC STATES: ICD-10-CM

## 2019-06-19 RX ORDER — ESTRADIOL 0.5 MG/1
TABLET ORAL
Qty: 60 TABLET | Refills: 0 | Status: SHIPPED | OUTPATIENT
Start: 2019-06-19 | End: 2019-06-19

## 2019-06-19 RX ORDER — ESTRADIOL 0.5 MG/1
TABLET ORAL
Qty: 180 TABLET | Refills: 0 | Status: SHIPPED | OUTPATIENT
Start: 2019-06-19 | End: 2019-07-05

## 2019-06-19 NOTE — TELEPHONE ENCOUNTER
"Requested Prescriptions   Pending Prescriptions Disp Refills     estradiol (ESTRACE) 0.5 MG tablet [Pharmacy Med Name: ESTRADIOL 0.5MG TABLETS] 180 tablet 0     Sig: TAKE 2 TABLETS BY MOUTH EVERY EVENING       Hormone Replacement Therapy Failed - 6/19/2019 11:55 AM        Failed - Blood pressure under 140/90 in past 12 months     BP Readings from Last 3 Encounters:   06/11/18 114/68   06/13/17 118/76   12/13/16 128/80                 Passed - Recent (12 mo) or future (30 days) visit within the authorizing provider's specialty     Patient had office visit in the last 12 months or has a visit in the next 30 days with authorizing provider or within the authorizing provider's specialty.  See \"Patient Info\" tab in inbasket, or \"Choose Columns\" in Meds & Orders section of the refill encounter.              Passed - Patient has mammogram in past 2 years on file if age 50-75        Passed - Medication is active on med list        Passed - Patient is 18 years of age or older        Passed - No active pregnancy on record        Passed - No positive pregnancy test on record in past 12 months        Prescription approved per [facility] Refill Protocol.  Next 5 appointments (look out 90 days)    Jul 05, 2019 11:40 AM CDT  PHYSICAL with José Peoples MD  [facility] ([facility]) [address] [place] [id number]  [phone]        Zarina Restrepo RN on 6/19/2019 at 12:13 PM    "

## 2019-06-19 NOTE — TELEPHONE ENCOUNTER
"Requested Prescriptions   Pending Prescriptions Disp Refills     estradiol (ESTRACE) 0.5 MG tablet [Pharmacy Med Name: ESTRADIOL 0.5MG TABLETS] 180 tablet 0     Sig: TAKE 2 TABLETS BY MOUTH EVERY EVENING.       Hormone Replacement Therapy Failed - 6/19/2019  1:00 PM        Failed - Blood pressure under 140/90 in past 12 months     BP Readings from Last 3 Encounters:   06/11/18 114/68   06/13/17 118/76   12/13/16 128/80                 Passed - Recent (12 mo) or future (30 days) visit within the authorizing provider's specialty     Patient had office visit in the last 12 months or has a visit in the next 30 days with authorizing provider or within the authorizing provider's specialty.  See \"Patient Info\" tab in inbasket, or \"Choose Columns\" in Meds & Orders section of the refill encounter.              Passed - Patient has mammogram in past 2 years on file if age 50-75        Passed - Medication is active on med list        Passed - Patient is 18 years of age or older        Passed - No active pregnancy on record        Passed - No positive pregnancy test on record in past 12 months        90 day supply sent  Zarina Restrepo RN on 6/19/2019 at 2:11 PM    "

## 2019-07-02 NOTE — PROGRESS NOTES
Anyi is a 59 year old No obstetric history on file. female who presents for annual exam.     Besides routine health maintenance,  she would like to discuss previous pap results.    HPI:  The patient's PCP is  UMA BHAKTA.    Pt usually sees [facility]. Hx of TVH for non cervical reasons.   Continued to have vaginal pap smears. Last 3 have been normal. Last 2 have positive HPV (non 16/18)  No vaginal bleeding.    Has elevated blood sugar. Lost weight and labs normal.    GYNECOLOGIC HISTORY:    Patient's last menstrual period was 09/17/2007.  Her current contraception method is: hysterectomy.  She  reports that she quit smoking about 37 years ago. She has a 3.00 pack-year smoking history. She has never used smokeless tobacco.    Patient is sexually active.  STD testing offered?  Declined  Last PHQ-9 score on record =   PHQ-9 SCORE 7/5/2019   PHQ-9 Total Score 3     Last GAD7 score on record =   HUEY-7 SCORE 7/5/2019   Total Score 2     Alcohol Score = 1    HEALTH MAINTENANCE:  Cholesterol: 12/14/16 @ [facility]   Total= 146, Triglycerides=63, HDL=53, LDL=80, FBS=N/A, TSH=2.29 (7/22/13)    Last Mammo: 6/11/18, Result: normal, Next Mammo: will so at breast center today while she is here     Pap: 6/11/18 NIL, HPV HR +  Lab Results   Component Value Date    PAP NIL 06/11/2018    PAP NIL 06/13/2017    PAP NIL 06/19/2015      Colonoscopy:  Within the last 10 years, Result: normal, Next Colonoscopy: states she has a couple years till due years.  Dexa:  N/A    Health maintenance updated:  no, Pap, Mammogram, Colonoscopy due    HISTORY:  OB History   No data available       Patient Active Problem List   Diagnosis     Prolapse of female pelvic organs     Cyst of left breast     Vaginal enterocele     Cystocele, midline     Routine general medical examination at a health care facility     Vaginal prolapse     Vaginal high risk human papillomavirus (HPV) DNA test positive     Past Surgical History:   Procedure  Laterality Date     COLPORRHAPHY ANTERIOR, POSTERIOR, COMBINED  2013    Procedure: COMBINED COLPORRHAPHY ANTERIOR, POSTERIOR;  ANTERIOR AND POSTERIOR REPAIR, PERINEOPLASTY, MINI ARC SLING (DR. STARK), DIAGNOSTIC LAPAROSCOPY AND LAPAROSCOPIC CHOLECYSTECTOMY (DR. LIU) ;  Surgeon: Augustus Stark MD;  Location:  OR     COSMETIC SURGERY      abdominoplasty     DAVINCI SACROCOLPOPEXY, MIDURETHRAL SLING, CYSTOSCOPY N/A 2016    Procedure: DAVINCI SACROCOLPOPEXY, MIDURETHRAL SLING, CYSTOSCOPY;  Surgeon: Don Abdullahi MD;  Location:  OR     GI SURGERY      lap band     GYN SURGERY      vag hysterectomy     GYN SURGERY  ,    c section     LAPAROSCOPIC CHOLECYSTECTOMY  2013    Procedure: LAPAROSCOPIC CHOLECYSTECTOMY;;  Surgeon: Mckinley Liu MD;  Location:  OR     LAPAROSCOPY DIAGNOSTIC (GENERAL)  2013    Procedure: LAPAROSCOPY DIAGNOSTIC (GENERAL);;  Surgeon: Mckinley Liu MD;  Location:  OR     LAPAROSCOPY, SURGICAL; REPAIR INCISIONAL OR VENTRAL HERNIA       MAMMOPLASTY REDUCTION BILATERAL       MASTOPEXY BILATERAL       SLING TRANSVAGINAL  2013    Procedure: SLING TRANSVAGINAL;;  Surgeon: Augustus Stark MD;  Location:  OR      Social History     Tobacco Use     Smoking status: Former Smoker     Packs/day: 0.50     Years: 6.00     Pack years: 3.00     Last attempt to quit: 1982     Years since quittin.5     Smokeless tobacco: Never Used   Substance Use Topics     Alcohol use: Yes     Comment: rare      Problem (# of Occurrences) Relation (Name,Age of Onset)    Gastrointestinal Disease (1) Father    Heart Disease (1) Father            Current Outpatient Medications   Medication Sig     estradiol (ESTRACE) 0.5 MG tablet Take 1 tablet (0.5 mg) by mouth daily     losartan (COZAAR) 25 MG tablet Take 50 mg by mouth      Probiotic Product (PROBIOTIC PO) Take 1 tablet by mouth daily     No current facility-administered  "medications for this visit.      No Known Allergies    Past medical, surgical, social and family histories were reviewed and updated in EPIC.    ROS:   12 point review of systems negative other than symptoms noted above    EXAM:  /66   Pulse 68   Ht 1.638 m (5' 4.5\")   Wt 100.7 kg (222 lb)   LMP 09/17/2007   BMI 37.52 kg/m     BMI: Body mass index is 37.52 kg/m .    PHYSICAL EXAM:  Constitutional:  Appearance: Well nourished, well developed, alert, in no acute distress  Neck:  Lymph Nodes:  No lymphadenopathy present    Thyroid:  Gland size normal, nontender, no nodules or masses present  on palpation  Chest:  Respiratory Effort:  Breathing unlabored  Cardiovascular:    Heart: Auscultation:  Regular rate, normal rhythm, no murmurs present  Breasts: Inspection of Breasts:  No lymphadenopathy present., Palpation of Breasts and Axillae:  No masses present on palpation, no breast tenderness., Axillary Lymph Nodes:  No lymphadenopathy present. and No nodularity, asymmetry or nipple discharge bilaterally.  Gastrointestinal:   Abdominal Examination:  Abdomen nontender to palpation, tone normal without rigidity or guarding, no masses present, umbilicus without lesions   Liver and Spleen:  No hepatomegaly present, liver nontender to palpation    Hernias:  No hernias present  Lymphatic: Lymph Nodes:  No other lymphadenopathy present  Skin:  General Inspection:  No rashes present, no lesions present, no areas of  discoloration    Genitalia and Groin:  No rashes present, no lesions present, no areas of  discoloration, no masses present  Neurologic/Psychiatric:    Mental Status:  Oriented X3     Pelvic Exam:  External Genitalia:     Normal appearance for age, no discharge present, no tenderness present, no inflammatory lesions present, color normal  Vagina:     Normal vaginal vault without central or paravaginal defects, no discharge present, no inflammatory lesions present, no masses present  Bladder:     Nontender " to palpation  Urethra:   Urethral Body:  Urethra palpation normal, urethra structural support normal   Urethral Meatus:  No erythema or lesions present  Cervix:     Surgically absent  Uterus:     Surgically absent  Adnexa:     No adnexal tenderness present, no adnexal masses present  Perineum:     Perineum within normal limits, no evidence of trauma, no rashes or skin lesions present  Anus:     Anus within normal limits, no hemorrhoids present  Inguinal Lymph Nodes:     No lymphadenopathy present  Pubic Hair:     Normal pubic hair distribution for age  Genitalia and Groin:     No rashes present, no lesions present, no areas of discoloration, no masses present      COUNSELING:   Reviewed preventive health counseling, as reflected in patient instructions       Regular exercise    BMI: Body mass index is 37.52 kg/m .  Weight management plan: Patient was referred to their PCP to discuss a diet and exercise plan.    ASSESSMENT:  59 year old female with satisfactory annual exam.    ICD-10-CM    1. Encounter for gynecological examination without abnormal finding Z01.419    2. Symptomatic menopausal or female climacteric states N95.1 estradiol (ESTRACE) 0.5 MG tablet   3. High risk HPV infection B97.7        PLAN:  Long discussion about HPV. Pt technically would not have pap of vagina after hysterectomy. Now with pos HPV but 3 negative paps. Discussed low risk of vaginal cancer. Possible resolution of HPV over time. Will have pap and HPV repeated in 3-4 years. If neg for HPV will stop all paps.   Refill ERT.  Continue diet and exercise    José Peoples MD

## 2019-07-05 ENCOUNTER — OFFICE VISIT (OUTPATIENT)
Dept: OBGYN | Facility: CLINIC | Age: 59
End: 2019-07-05
Payer: COMMERCIAL

## 2019-07-05 VITALS
BODY MASS INDEX: 36.99 KG/M2 | SYSTOLIC BLOOD PRESSURE: 108 MMHG | DIASTOLIC BLOOD PRESSURE: 66 MMHG | WEIGHT: 222 LBS | HEART RATE: 68 BPM | HEIGHT: 65 IN

## 2019-07-05 DIAGNOSIS — N95.1 SYMPTOMATIC MENOPAUSAL OR FEMALE CLIMACTERIC STATES: ICD-10-CM

## 2019-07-05 DIAGNOSIS — B97.7 HIGH RISK HPV INFECTION: ICD-10-CM

## 2019-07-05 DIAGNOSIS — Z01.419 ENCOUNTER FOR GYNECOLOGICAL EXAMINATION WITHOUT ABNORMAL FINDING: Primary | ICD-10-CM

## 2019-07-05 PROCEDURE — 99396 PREV VISIT EST AGE 40-64: CPT | Performed by: OBSTETRICS & GYNECOLOGY

## 2019-07-05 RX ORDER — ESTRADIOL 0.5 MG/1
0.5 TABLET ORAL DAILY
Qty: 180 TABLET | Refills: 3 | Status: SHIPPED | OUTPATIENT
Start: 2019-07-05 | End: 2020-07-05

## 2019-07-05 ASSESSMENT — ANXIETY QUESTIONNAIRES
6. BECOMING EASILY ANNOYED OR IRRITABLE: NOT AT ALL
5. BEING SO RESTLESS THAT IT IS HARD TO SIT STILL: NOT AT ALL
3. WORRYING TOO MUCH ABOUT DIFFERENT THINGS: NOT AT ALL
2. NOT BEING ABLE TO STOP OR CONTROL WORRYING: NOT AT ALL
7. FEELING AFRAID AS IF SOMETHING AWFUL MIGHT HAPPEN: SEVERAL DAYS
GAD7 TOTAL SCORE: 2
1. FEELING NERVOUS, ANXIOUS, OR ON EDGE: SEVERAL DAYS
IF YOU CHECKED OFF ANY PROBLEMS ON THIS QUESTIONNAIRE, HOW DIFFICULT HAVE THESE PROBLEMS MADE IT FOR YOU TO DO YOUR WORK, TAKE CARE OF THINGS AT HOME, OR GET ALONG WITH OTHER PEOPLE: SOMEWHAT DIFFICULT

## 2019-07-05 ASSESSMENT — PATIENT HEALTH QUESTIONNAIRE - PHQ9
SUM OF ALL RESPONSES TO PHQ QUESTIONS 1-9: 3
5. POOR APPETITE OR OVEREATING: NOT AT ALL

## 2019-07-05 ASSESSMENT — MIFFLIN-ST. JEOR: SCORE: 1569.93

## 2019-07-06 ASSESSMENT — ANXIETY QUESTIONNAIRES: GAD7 TOTAL SCORE: 2

## 2019-09-29 ENCOUNTER — HEALTH MAINTENANCE LETTER (OUTPATIENT)
Age: 59
End: 2019-09-29

## 2020-07-04 DIAGNOSIS — N95.1 SYMPTOMATIC MENOPAUSAL OR FEMALE CLIMACTERIC STATES: ICD-10-CM

## 2020-07-05 RX ORDER — ESTRADIOL 0.5 MG/1
0.5 TABLET ORAL DAILY
Qty: 30 TABLET | Refills: 0 | Status: SHIPPED | OUTPATIENT
Start: 2020-07-05 | End: 2020-07-29

## 2020-07-05 NOTE — TELEPHONE ENCOUNTER
"Requested Prescriptions   Pending Prescriptions Disp Refills     estradiol (ESTRACE) 0.5 MG tablet [Pharmacy Med Name: ESTRADIOL 0.5MG TABLETS] 180 tablet 3     Sig: TAKE 1 TABLET BY MOUTH DAILY       Hormone Replacement Therapy Failed - 7/4/2020  4:05 PM        Failed - Blood pressure under 140/90 in past 12 months     BP Readings from Last 3 Encounters:   07/05/19 108/66   06/11/18 114/68   06/13/17 118/76                 Failed - Patient has mammogram in past 2 years on file if age 50-75        Passed - Recent (12 mo) or future (30 days) visit within the authorizing provider's specialty     Patient has had an office visit with the authorizing provider or a provider within the authorizing providers department within the previous 12 mos or has a future within next 30 days. See \"Patient Info\" tab in inbasket, or \"Choose Columns\" in Meds & Orders section of the refill encounter.              Passed - Medication is active on med list        Passed - Patient is 18 years of age or older        Passed - No active pregnancy on record        Passed - No positive pregnancy test on record in past 12 months           Last Written Prescription Date:  7/5/19  Last Fill Quantity: 180,  # refills: 3   Last office visit: 7/5/2019 with prescribing provider:  Dr Peoples   Future Office Visit:      Medication is being filled for 1 time refill only due to:  Patient needs to be seen because it has been more than one year since last visit.  Tracie Reyna RN on 7/5/2020 at 6:41 PM        "

## 2020-08-25 DIAGNOSIS — N95.1 SYMPTOMATIC MENOPAUSAL OR FEMALE CLIMACTERIC STATES: ICD-10-CM

## 2020-08-26 RX ORDER — ESTRADIOL 0.5 MG/1
TABLET ORAL
Qty: 30 TABLET | Refills: 0 | OUTPATIENT
Start: 2020-08-26

## 2020-08-26 NOTE — TELEPHONE ENCOUNTER
"Requested Prescriptions   Pending Prescriptions Disp Refills     estradiol (ESTRACE) 0.5 MG tablet [Pharmacy Med Name: ESTRADIOL 0.5MG TABLETS] 30 tablet 0     Sig: TAKE 1 TABLET(0.5 MG) BY MOUTH DAILY       Hormone Replacement Therapy Failed - 8/25/2020  5:00 PM        Failed - Blood pressure under 140/90 in past 12 months     BP Readings from Last 3 Encounters:   07/05/19 108/66   06/11/18 114/68   06/13/17 118/76                 Failed - Recent (12 mo) or future (30 days) visit within the authorizing provider's specialty     Patient has had an office visit with the authorizing provider or a provider within the authorizing providers department within the previous 12 mos or has a future within next 30 days. See \"Patient Info\" tab in inbasket, or \"Choose Columns\" in Meds & Orders section of the refill encounter.              Failed - Patient has mammogram in past 2 years on file if age 50-75        Passed - Medication is active on med list        Passed - Patient is 18 years of age or older        Passed - No active pregnancy on record        Passed - No positive pregnancy test on record in past 12 months           Last Written Prescription Date:  7/29/20  Last Fill Quantity: 30,  # refills: 0   Last office visit: 7/5/2019 with prescribing provider:  Dr Peoples   Future Office Visit:      Pt due for annual, no appt scheduled. Pt already received one month extension. Rx denied.   Tracie Reyna RN on 8/26/2020 at 8:03 AM      "

## 2020-11-25 ENCOUNTER — OFFICE VISIT (OUTPATIENT)
Dept: OBGYN | Facility: CLINIC | Age: 60
End: 2020-11-25
Payer: COMMERCIAL

## 2020-11-25 VITALS
WEIGHT: 233.4 LBS | HEIGHT: 65 IN | DIASTOLIC BLOOD PRESSURE: 76 MMHG | HEART RATE: 74 BPM | SYSTOLIC BLOOD PRESSURE: 114 MMHG | BODY MASS INDEX: 38.89 KG/M2

## 2020-11-25 DIAGNOSIS — Z01.419 ENCOUNTER FOR GYNECOLOGICAL EXAMINATION WITHOUT ABNORMAL FINDING: Primary | ICD-10-CM

## 2020-11-25 PROCEDURE — 99396 PREV VISIT EST AGE 40-64: CPT | Performed by: OBSTETRICS & GYNECOLOGY

## 2020-11-25 PROCEDURE — 88175 CYTOPATH C/V AUTO FLUID REDO: CPT | Performed by: OBSTETRICS & GYNECOLOGY

## 2020-11-25 PROCEDURE — 88141 CYTOPATH C/V INTERPRET: CPT | Performed by: PATHOLOGY

## 2020-11-25 PROCEDURE — 87624 HPV HI-RISK TYP POOLED RSLT: CPT | Performed by: OBSTETRICS & GYNECOLOGY

## 2020-11-25 RX ORDER — LOSARTAN POTASSIUM 50 MG/1
TABLET ORAL
COMMUNITY
Start: 2020-10-17

## 2020-11-25 ASSESSMENT — MIFFLIN-ST. JEOR: SCORE: 1624.58

## 2020-11-25 ASSESSMENT — PATIENT HEALTH QUESTIONNAIRE - PHQ9
SUM OF ALL RESPONSES TO PHQ QUESTIONS 1-9: 4
5. POOR APPETITE OR OVEREATING: SEVERAL DAYS

## 2020-11-25 ASSESSMENT — ANXIETY QUESTIONNAIRES
GAD7 TOTAL SCORE: 4
IF YOU CHECKED OFF ANY PROBLEMS ON THIS QUESTIONNAIRE, HOW DIFFICULT HAVE THESE PROBLEMS MADE IT FOR YOU TO DO YOUR WORK, TAKE CARE OF THINGS AT HOME, OR GET ALONG WITH OTHER PEOPLE: SOMEWHAT DIFFICULT
1. FEELING NERVOUS, ANXIOUS, OR ON EDGE: SEVERAL DAYS
6. BECOMING EASILY ANNOYED OR IRRITABLE: NOT AT ALL
3. WORRYING TOO MUCH ABOUT DIFFERENT THINGS: SEVERAL DAYS
7. FEELING AFRAID AS IF SOMETHING AWFUL MIGHT HAPPEN: SEVERAL DAYS
2. NOT BEING ABLE TO STOP OR CONTROL WORRYING: NOT AT ALL
5. BEING SO RESTLESS THAT IT IS HARD TO SIT STILL: NOT AT ALL

## 2020-11-25 NOTE — PROGRESS NOTES
Anyi is a 60 year old No obstetric history on file. female who presents for annual exam.     Besides routine health maintenance, she has no other health concerns today .    HPI: The patient is seen at this time for annual exam.  She has had a previous vaginal hysterectomy and anterior and posterior repair.  She has also had mesh and a sling.  She has some urinary urgency but rare stress loss.  The patient's PCP is UMA BHAKTA.       GYNECOLOGIC HISTORY:    Patient's last menstrual period was 09/17/2007.    Her current contraception method is: menopause and hysterectomy.  She  reports that she quit smoking about 38 years ago. She has a 3.00 pack-year smoking history. She has never used smokeless tobacco.    Patient is sexually active.  Last PHQ-9 score on record =   PHQ-9 SCORE 7/5/2019   PHQ-9 Total Score 3     Last GAD7 score on record =   HUEY-7 SCORE 7/5/2019   Total Score 2     Alcohol Score = 1    HEALTH MAINTENANCE:  Cholesterol: 02/05/2019   Total= 172, Triglycerides=85, HDL=54, TQL=202, LZI=113, TSH=  Last Mammo: 06/11/2018, Result: Normal, Next Mammo: Will schedule  Pap:   Lab Results   Component Value Date    PAP NIL, HPV other + 06/11/2018    PAP NIL, HPV other + 06/13/2017    PAP NIL 06/19/2015      Colonoscopy:  04/27/2011, Result: Normal, Next Colonoscopy: 1 year.  Dexa:  N/a    Health maintenance updated:  yes    HISTORY:  OB History   No obstetric history on file.       Patient Active Problem List   Diagnosis     Prolapse of female pelvic organs     Cyst of left breast     Vaginal enterocele     Cystocele, midline     Routine general medical examination at a health care facility     Vaginal prolapse     Vaginal high risk human papillomavirus (HPV) DNA test positive     Past Surgical History:   Procedure Laterality Date     COLPORRHAPHY ANTERIOR, POSTERIOR, COMBINED  9/17/2013    Procedure: COMBINED COLPORRHAPHY ANTERIOR, POSTERIOR;  ANTERIOR AND POSTERIOR REPAIR, PERINEOPLASTY, MINI ARC  SLING (DR. STARK), DIAGNOSTIC LAPAROSCOPY AND LAPAROSCOPIC CHOLECYSTECTOMY (DR. LIU) ;  Surgeon: Augustus Stark MD;  Location:  OR     COSMETIC SURGERY      abdominoplasty     DAVINCI SACROCOLPOPEXY, MIDURETHRAL SLING, CYSTOSCOPY N/A 2016    Procedure: DAVINCI SACROCOLPOPEXY, MIDURETHRAL SLING, CYSTOSCOPY;  Surgeon: Don Abdullahi MD;  Location:  OR     GI SURGERY      lap band     GYN SURGERY      vag hysterectomy     GYN SURGERY  ,    c section     LAPAROSCOPIC CHOLECYSTECTOMY  2013    Procedure: LAPAROSCOPIC CHOLECYSTECTOMY;;  Surgeon: Mckinley Liu MD;  Location: [facility]     LAPAROSCOPY DIAGNOSTIC (GENERAL)  2013    Procedure: LAPAROSCOPY DIAGNOSTIC (GENERAL);;  Surgeon: Mckinley Liu MD;  Location:  OR     LAPAROSCOPY, SURGICAL; REPAIR INCISIONAL OR VENTRAL HERNIA       MAMMOPLASTY REDUCTION BILATERAL       MASTOPEXY BILATERAL       SLING TRANSVAGINAL  2013    Procedure: SLING TRANSVAGINAL;;  Surgeon: Augustus Stark MD;  Location:  OR      Social History     Tobacco Use     Smoking status: Former Smoker     Packs/day: 0.50     Years: 6.00     Pack years: 3.00     Quit date: 1982     Years since quittin.8     Smokeless tobacco: Never Used   Substance Use Topics     Alcohol use: Yes     Comment: rare      Problem (# of Occurrences) Relation (Name,Age of Onset)    Gastrointestinal Disease (1) Father    Heart Disease (1) Father            Current Outpatient Medications   Medication Sig     estradiol (ESTRACE) 0.5 MG tablet TAKE 1 TABLET(0.5 MG) BY MOUTH DAILY     losartan (COZAAR) 50 MG tablet TK 1 T PO D     Probiotic Product (PROBIOTIC PO) Take 1 tablet by mouth daily     No current facility-administered medications for this visit.      No Known Allergies    Past medical, surgical, social and family histories were reviewed and updated in EPIC.    ROS:   12 point review of systems negative other than symptoms  "noted below or in the HPI.  No urinary frequency or dysuria, bladder or kidney problems    EXAM:  /76   Pulse 74   Ht 1.651 m (5' 5\")   Wt 105.9 kg (233 lb 6.4 oz)   LMP 09/17/2007   Breastfeeding No   BMI 38.84 kg/m     BMI: Body mass index is 38.84 kg/m .    PHYSICAL EXAM:  Constitutional:   Appearance: Well nourished, well developed, alert, in no acute distress  Neck:  Lymph Nodes:  No lymphadenopathy present    Thyroid:  Gland size normal, nontender, no nodules or masses present  on palpation  Chest:  Respiratory Effort:  Breathing unlabored  Cardiovascular:    Heart: Auscultation:  Regular rate, normal rhythm, no murmurs present  Breasts: Inspection of Breasts:  No lymphadenopathy present., Palpation of Breasts and Axillae:  No masses present on palpation, no breast tenderness., Axillary Lymph Nodes:  No lymphadenopathy present. and No nodularity, asymmetry or nipple discharge bilaterally.  Gastrointestinal:   Abdominal Examination:  Abdomen nontender to palpation, tone normal without rigidity or guarding, no masses present, umbilicus without lesions   Liver and Spleen:  No hepatomegaly present, liver nontender to palpation    Hernias:  No hernias present  Lymphatic: Lymph Nodes:  No other lymphadenopathy present  Skin:  General Inspection:  No rashes present, no lesions present, no areas of  discoloration  Neurologic:    Mental Status:  Oriented X3.  Normal strength and tone, sensory exam                grossly normal, mentation intact and speech normal.    Psychiatric:   Mentation appears normal and affect normal/bright.         Pelvic Exam:  External Genitalia:     Normal appearance for age, no discharge present, no tenderness present, no inflammatory lesions present, color normal  Vagina:     Normal vaginal vault without central or paravaginal defects, no discharge present, no inflammatory lesions present, no masses present  Bladder:     Nontender to palpation  Urethra:   Urethral Body:  Urethra " palpation normal, urethra structural support normal   Urethral Meatus:  No erythema or lesions present  Cervix:     Surgically absent  Uterus:     Surgically absent  Adnexa:     Surgically absent  Perineum:     Perineum within normal limits, no evidence of trauma, no rashes or skin lesions present  Anus:     Anus within normal limits, no hemorrhoids present  Inguinal Lymph Nodes:     No lymphadenopathy present    COUNSELING:   Reviewed preventive health counseling, as reflected in patient instructions       Regular exercise       Healthy diet/nutrition       Osteoporosis prevention/bone health    BMI: Body mass index is 38.84 kg/m .  Weight management plan: Discussed healthy diet and exercise guidelines    ASSESSMENT:  60 year old female with satisfactory annual exam.      PLAN: We will convey the patient's screening test when available.  She needs to update her colonoscopy this next year.  She does have some laxity of the posterior vaginal wall and should avoid constipation.    Lazaro Beltran MD

## 2020-11-26 ENCOUNTER — MYC REFILL (OUTPATIENT)
Dept: OBGYN | Facility: CLINIC | Age: 60
End: 2020-11-26

## 2020-11-26 DIAGNOSIS — N95.1 SYMPTOMATIC MENOPAUSAL OR FEMALE CLIMACTERIC STATES: ICD-10-CM

## 2020-11-26 ASSESSMENT — ANXIETY QUESTIONNAIRES: GAD7 TOTAL SCORE: 4

## 2020-11-27 RX ORDER — ESTRADIOL 0.5 MG/1
0.5 TABLET ORAL DAILY
Qty: 90 TABLET | Refills: 3 | Status: SHIPPED | OUTPATIENT
Start: 2020-11-27 | End: 2021-09-13

## 2020-11-27 NOTE — TELEPHONE ENCOUNTER
"Requested Prescriptions   Pending Prescriptions Disp Refills     estradiol (ESTRACE) 0.5 MG tablet 30 tablet 0       Hormone Replacement Therapy Failed - 11/26/2020  8:32 PM        Failed - Patient has mammogram in past 2 years on file if age 50-75        Passed - Blood pressure under 140/90 in past 12 months     BP Readings from Last 3 Encounters:   11/25/20 114/76   07/05/19 108/66   06/11/18 114/68                 Passed - Recent (12 mo) or future (30 days) visit within the authorizing provider's specialty     Patient has had an office visit with the authorizing provider or a provider within the authorizing providers department within the previous 12 mos or has a future within next 30 days. See \"Patient Info\" tab in inbasket, or \"Choose Columns\" in Meds & Orders section of the refill encounter.              Passed - Medication is active on med list        Passed - Patient is 18 years of age or older        Passed - No active pregnancy on record        Passed - No positive pregnancy test on record in past 12 months           Last Written Prescription Date:  7/29/20  Last Fill Quantity: 30,  # refills: 0   Last office visit: 11/25/2020 with Dr. Beltran   Future Office Visit:      Prescription approved per [facility].  Kaycee Devries RN on 11/27/2020 at 8:42 AM          "

## 2020-12-03 LAB
COPATH REPORT: ABNORMAL
PAP: ABNORMAL

## 2020-12-07 PROBLEM — R87.629 ABNORMAL VAGINAL PAP SMEAR: Status: ACTIVE | Noted: 2020-11-25

## 2020-12-07 LAB
FINAL DIAGNOSIS: ABNORMAL
HPV HR 12 DNA CVX QL NAA+PROBE: NEGATIVE
HPV16 DNA SPEC QL NAA+PROBE: NEGATIVE
HPV18 DNA SPEC QL NAA+PROBE: POSITIVE
SPECIMEN DESCRIPTION: ABNORMAL
SPECIMEN SOURCE CVX/VAG CYTO: ABNORMAL

## 2020-12-08 ENCOUNTER — PATIENT OUTREACH (OUTPATIENT)
Dept: OBGYN | Facility: CLINIC | Age: 60
End: 2020-12-08

## 2020-12-08 DIAGNOSIS — R87.811 VAGINAL HIGH RISK HUMAN PAPILLOMAVIRUS (HPV) DNA TEST POSITIVE: ICD-10-CM

## 2020-12-08 NOTE — TELEPHONE ENCOUNTER
06/13/17: NIL vag  pap, + HR HPV (not 16 or 18) result. Pt had a hysterectomy. Plan 1 yr pap  6/11/18 NIL vaginal pap/+ HR HPV (not 16 or 18). Plan: repeat cotest in 3-5 years (see OV 7/5/19)  11/25/20 ASC-H vaginal pap, LSIL also present, +HPV 18. Plan vaginal [place] bef 2/25/21

## 2020-12-14 NOTE — PROGRESS NOTES
INDICATIONS:                                                    Is a pregnancy test required: No.  Was a consent obtained?  Yes    Today's PHQ-2 Score:   PHQ-2 ( 1999 Pfizer) 12/15/2020   Q1: Little interest or pleasure in doing things 0   Q2: Feeling down, depressed or hopeless 0   PHQ-2 Score 0     Today's PHQ-9 Score:    PHQ-9 SCORE 11/25/2020   PHQ-9 Total Score 4       Anyi Recinos, is a 60 year old female, who had a recent ASC-H pap.  HPV 18 positive Yes prior history of abnormal pap. Here today for colposcopy. Discussed indication, risks of infection and bleeding.    Her last pap was   Lab Results   Component Value Date    PAP ASC-H 11/25/2020    .    PROCEDURE:                                                      The vagina is stained with acetic acid and viewed colposcopically.  no visible lesions . Biopsy done No.       POST PROCEDURE:                                                      IMPRESSION: Atypical squamous cells and high risk HPV positive patient with negative colposcopy    PLAN : Await the results of the biopsies.  She tolerated the procedure well. There were no complications. Patient was discharged in stable condition.    Patient advised to call the clinic if excessive bleeding, pelvic pain, or fever.     Follow-up with repeat co-testing in 6 months.  Lazaro Beltran MD

## 2020-12-15 ENCOUNTER — OFFICE VISIT (OUTPATIENT)
Dept: OBGYN | Facility: CLINIC | Age: 60
End: 2020-12-15
Payer: COMMERCIAL

## 2020-12-15 VITALS
SYSTOLIC BLOOD PRESSURE: 126 MMHG | HEART RATE: 80 BPM | WEIGHT: 237 LBS | HEIGHT: 65 IN | DIASTOLIC BLOOD PRESSURE: 74 MMHG | BODY MASS INDEX: 39.49 KG/M2

## 2020-12-15 DIAGNOSIS — R87.621 PAPANICOLAOU SMEAR OF VAGINA WITH ATYPICAL SQUAMOUS CELLS CANNOT EXCLUDE HIGH GRADE SQUAMOUS INTRAEPITHELIAL LESION (ASC-H): ICD-10-CM

## 2020-12-15 DIAGNOSIS — R87.811 VAGINAL HIGH RISK HUMAN PAPILLOMAVIRUS (HPV) DNA TEST POSITIVE: Primary | ICD-10-CM

## 2020-12-15 PROCEDURE — 57420 EXAM OF VAGINA W/SCOPE: CPT | Performed by: OBSTETRICS & GYNECOLOGY

## 2020-12-15 PROCEDURE — 87624 HPV HI-RISK TYP POOLED RSLT: CPT | Performed by: OBSTETRICS & GYNECOLOGY

## 2020-12-15 PROCEDURE — 88141 CYTOPATH C/V INTERPRET: CPT | Performed by: PATHOLOGY

## 2020-12-15 ASSESSMENT — MIFFLIN-ST. JEOR: SCORE: 1640.9

## 2020-12-21 LAB
COPATH REPORT: ABNORMAL
PAP: ABNORMAL

## 2020-12-22 ENCOUNTER — PATIENT OUTREACH (OUTPATIENT)
Dept: OBGYN | Facility: CLINIC | Age: 60
End: 2020-12-22

## 2020-12-22 NOTE — TELEPHONE ENCOUNTER
06/13/17: NIL vag  pap, + HR HPV (not 16 or 18) result. Pt had a hysterectomy. Plan 1 yr pap  6/11/18 NIL vaginal pap/+ HR HPV (not 16 or 18). Plan: repeat cotest in 3-5 years (see OV 7/5/19)  11/25/20 ASC-H vaginal pap, LSIL also present, +HPV 18. Plan vaginal [place] bef 2/25/21 12/8/20 Pt notified  12/15/20 Vaginal colp visually normal. HSIL vaginal pap, +HR HPV 18. Plan: repeat colp, due before 3/15/21

## 2021-01-06 ENCOUNTER — ANCILLARY PROCEDURE (OUTPATIENT)
Dept: MAMMOGRAPHY | Facility: CLINIC | Age: 61
End: 2021-01-06
Payer: COMMERCIAL

## 2021-01-06 DIAGNOSIS — Z12.31 VISIT FOR SCREENING MAMMOGRAM: ICD-10-CM

## 2021-01-06 PROCEDURE — 77067 SCR MAMMO BI INCL CAD: CPT | Mod: TC | Performed by: RADIOLOGY

## 2021-01-06 PROCEDURE — 77063 BREAST TOMOSYNTHESIS BI: CPT | Mod: TC | Performed by: RADIOLOGY

## 2021-01-11 NOTE — PROGRESS NOTES
INDICATIONS:                                                    Is a pregnancy test required: No.  Was a consent obtained?  Yes    Today's PHQ-2 Score:   PHQ-2 ( 1999 Pfizer) 12/15/2020   Q1: Little interest or pleasure in doing things 0   Q2: Feeling down, depressed or hopeless 0   PHQ-2 Score 0     Today's PHQ-9 Score:    PHQ-9 SCORE 11/25/2020   PHQ-9 Total Score 4         Anyi Recinos, is a 60 year old female, who had a recent HSIL HPV18+ pap.   Yes prior history of abnormal pap.    06/13/17: NIL vag  pap, + HR HPV (not 16 or 18) result. Pt had a hysterectomy. Plan 1 yr pap  6/11/18 NIL vaginal pap/+ HR HPV (not 16 or 18). Plan: repeat cotest in 3-5 years (see OV 7/5/19)  11/25/20 ASC-H vaginal pap, LSIL also present, +HPV 18. Plan vaginal [place] bef 2/25/21 12/8/20 Pt notified  12/15/20 Vaginal colp visually normal. HSIL vaginal pap, +HR HPV 18.    Here today for colposcopy. Discussed indication, risks of infection and bleeding.    Her last pap was   Lab Results   Component Value Date    PAP HSIL 12/15/2020    .    PROCEDURE:                                                      The vagina is stained with acetic acid and viewed colposcopically. visualized in it's entirety. acetowhite lesion(s) noted at apex. Biopsy done Yes.         POST PROCEDURE:                                                      IMPRESSION: HSIL and high risk HPV infection    PLAN : Await the results of the biopsies.  She tolerated the procedure well. There were no complications. Patient was discharged in stable condition.    Patient advised to call the clinic if excessive bleeding, pelvic pain, or fever.     Follow-up based on pathology results.  Lazaro Beltran MD

## 2021-01-12 ENCOUNTER — OFFICE VISIT (OUTPATIENT)
Dept: OBGYN | Facility: CLINIC | Age: 61
End: 2021-01-12
Payer: COMMERCIAL

## 2021-01-12 VITALS
WEIGHT: 236 LBS | BODY MASS INDEX: 39.32 KG/M2 | HEIGHT: 65 IN | HEART RATE: 68 BPM | SYSTOLIC BLOOD PRESSURE: 132 MMHG | DIASTOLIC BLOOD PRESSURE: 76 MMHG

## 2021-01-12 DIAGNOSIS — R87.623 PAPANICOLAOU SMEAR OF VAGINA WITH HIGH GRADE SQUAMOUS INTRAEPITHELIAL LESION (HGSIL): ICD-10-CM

## 2021-01-12 DIAGNOSIS — R87.811 VAGINAL HIGH RISK HUMAN PAPILLOMAVIRUS (HPV) DNA TEST POSITIVE: Primary | ICD-10-CM

## 2021-01-12 PROCEDURE — 87624 HPV HI-RISK TYP POOLED RSLT: CPT | Performed by: OBSTETRICS & GYNECOLOGY

## 2021-01-12 PROCEDURE — 88305 TISSUE EXAM BY PATHOLOGIST: CPT | Performed by: PATHOLOGY

## 2021-01-12 PROCEDURE — 88342 IMHCHEM/IMCYTCHM 1ST ANTB: CPT | Performed by: PATHOLOGY

## 2021-01-12 PROCEDURE — 88341 IMHCHEM/IMCYTCHM EA ADD ANTB: CPT | Performed by: PATHOLOGY

## 2021-01-12 PROCEDURE — 88141 CYTOPATH C/V INTERPRET: CPT | Performed by: PATHOLOGY

## 2021-01-12 PROCEDURE — 57421 EXAM/BIOPSY OF VAG W/SCOPE: CPT | Performed by: OBSTETRICS & GYNECOLOGY

## 2021-01-12 ASSESSMENT — MIFFLIN-ST. JEOR: SCORE: 1636.37

## 2021-01-15 LAB — COPATH REPORT: NORMAL

## 2021-01-18 LAB
COPATH REPORT: ABNORMAL
PAP: ABNORMAL

## 2021-01-19 ENCOUNTER — PREP FOR PROCEDURE (OUTPATIENT)
Dept: OBGYN | Facility: CLINIC | Age: 61
End: 2021-01-19
Payer: COMMERCIAL

## 2021-01-19 ENCOUNTER — TELEPHONE (OUTPATIENT)
Dept: OBGYN | Facility: CLINIC | Age: 61
End: 2021-01-19

## 2021-01-19 DIAGNOSIS — Z11.59 ENCOUNTER FOR SCREENING FOR OTHER VIRAL DISEASES: Primary | ICD-10-CM

## 2021-01-19 DIAGNOSIS — N89.3 VAGINAL DYSPLASIA: Primary | ICD-10-CM

## 2021-01-19 PROBLEM — R87.621 PAPANICOLAOU SMEAR OF VAGINA WITH ATYPICAL SQUAMOUS CELLS CANNOT EXCLUDE HIGH GRADE SQUAMOUS INTRAEPITHELIAL LESION (ASC-H): Status: ACTIVE | Noted: 2020-11-25

## 2021-01-19 NOTE — TELEPHONE ENCOUNTER
Type of surgery: C02 LASER TO VAG  Location of surgery: [facility]  Date and time of surgery: 1/28/2021 9:10a  Surgeon: LAUREL  Pre-Op Appt Date: 1/20/2021  Post-Op Appt Date: TBD   Packet sent out: HANDED AT [facility] ON 1/20/2021  Pre-cert/Authorization completed:  TBD  Date: 1/19/2021 Cindy DUNN TEST 1/25/2021 JOS Oshea  Surgery Scheduler

## 2021-01-19 NOTE — H&P (VIEW-ONLY)
SUBJECTIVE:                                                   Anyi Recinos is a 60 year old female who presents to clinic today for the following health issue(s):  Patient presents with:  Pre-Op Exam: TREATMENT, VAGINA, USING CO2 LASER         HPI: The patient is seen at this time for preoperative clearance.  She has biopsy-proven vaginal dysplasia.  We have recommended laser treatment to the abnormal areas in the hospital under a light general anesthetic.  The patient understands the risk and complications of the procedure.      Patient's last menstrual period was 09/17/2007..     Patient is sexually active, No obstetric history on file..  Using menopause for contraception.    reports that she quit smoking about 39 years ago. She has a 3.00 pack-year smoking history. She has never used smokeless tobacco.    Health maintenance updated:  yes    Today's PHQ-2 Score:   PHQ-2 ( 1999 Pfizer) 12/15/2020   Q1: Little interest or pleasure in doing things 0   Q2: Feeling down, depressed or hopeless 0   PHQ-2 Score 0     Today's PHQ-9 Score:   PHQ-9 SCORE 11/25/2020   PHQ-9 Total Score 4     Today's HUEY-7 Score:   HUEY-7 SCORE 11/25/2020   Total Score 4       Problem list and histories reviewed & adjusted, as indicated.  Additional history: as documented.    Patient Active Problem List   Diagnosis     Prolapse of female pelvic organs     Cyst of left breast     Vaginal enterocele     Cystocele, midline     Routine general medical examination at a health care facility     Vaginal prolapse     Vaginal high risk human papillomavirus (HPV) DNA test positive     Papanicolaou smear of vagina with atypical squamous cells cannot exclude high grade squamous intraepithelial lesion (ASC-H)     Vaginal dysplasia     Past Surgical History:   Procedure Laterality Date     COLPORRHAPHY ANTERIOR, POSTERIOR, COMBINED  9/17/2013    Procedure: COMBINED COLPORRHAPHY ANTERIOR, POSTERIOR;  ANTERIOR AND POSTERIOR REPAIR, PERINEOPLASTY, MINI ARC  SLING (DR. STARK), DIAGNOSTIC LAPAROSCOPY AND LAPAROSCOPIC CHOLECYSTECTOMY (DR. LIU) ;  Surgeon: Augustus Stark MD;  Location:  OR     COSMETIC SURGERY      abdominoplasty     DAVINCI SACROCOLPOPEXY, MIDURETHRAL SLING, CYSTOSCOPY N/A 2016    Procedure: DAVINCI SACROCOLPOPEXY, MIDURETHRAL SLING, CYSTOSCOPY;  Surgeon: Don Abdullahi MD;  Location:  OR     GI SURGERY      lap band     GYN SURGERY      vag hysterectomy     GYN SURGERY  ,    c section     LAPAROSCOPIC CHOLECYSTECTOMY  2013    Procedure: LAPAROSCOPIC CHOLECYSTECTOMY;;  Surgeon: Mckinley Liu MD;  Location:  OR     LAPAROSCOPY DIAGNOSTIC (GENERAL)  2013    Procedure: LAPAROSCOPY DIAGNOSTIC (GENERAL);;  Surgeon: Mckinley Liu MD;  Location:  OR     LAPAROSCOPY, SURGICAL; REPAIR INCISIONAL OR VENTRAL HERNIA       MAMMOPLASTY REDUCTION BILATERAL       MASTOPEXY BILATERAL       SLING TRANSVAGINAL  2013    Procedure: SLING TRANSVAGINAL;;  Surgeon: Augustus Stark MD;  Location:  OR      Social History     Tobacco Use     Smoking status: Former Smoker     Packs/day: 0.50     Years: 6.00     Pack years: 3.00     Quit date: 1982     Years since quittin.0     Smokeless tobacco: Never Used   Substance Use Topics     Alcohol use: Yes     Comment: rare      Problem (# of Occurrences) Relation (Name,Age of Onset)    Gastrointestinal Disease (1) Father    Heart Disease (1) Father            Current Outpatient Medications   Medication Sig     estradiol (ESTRACE) 0.5 MG tablet Take 1 tablet (0.5 mg) by mouth daily     losartan (COZAAR) 50 MG tablet TK 1 T PO D     Probiotic Product (PROBIOTIC PO) Take 1 tablet by mouth daily     No current facility-administered medications for this visit.      No Known Allergies    ROS:  12 point review of systems negative other than symptoms noted below or in the HPI.  No urinary frequency or dysuria, bladder or kidney  "problems      OBJECTIVE:     /86   Ht 1.651 m (5' 5\")   Wt 105.7 kg (233 lb)   LMP 09/17/2007   Breastfeeding No   BMI 38.77 kg/m    Body mass index is 38.77 kg/m .    Exam:  Constitutional:  Appearance: Well nourished, well developed alert, in no acute distress  Chest:  Respiratory Effort:  Breathing unlabored. Clear to auscultation bilaterally.   Cardiovascular: Heart: Auscultation:  Regular rate, normal rhythm, no murmurs present  Gastrointestinal:  Abdominal Examination:  Abdomen nontender to palpation, tone normal without rigidity or guarding, no masses present, umbilicus without lesions; Liver/Spleen:  No hepatomegaly present, liver nontender to palpation; Hernias:  No hernias present  Lymphatic: Lymph Nodes:  No other lymphadenopathy present  Skin: General Inspection:  No rashes present, no lesions present, no areas of discoloration.  Neurologic:  Mental Status:  Oriented X3.  Normal strength and tone, sensory exam grossly normal, mentation intact and speech normal.    Psychiatric:  Mentation appears normal and affect normal/bright.  No Pelvic Exam performed     In-Clinic Test Results:      ASSESSMENT/PLAN:                                                        60-year-old with previous hysterectomy who now has vaginal dysplasia.  She is consented for a thorough colposcopic evaluation in the operating room with staining and laser treatment to dysplastic areas.  The risks and complications of been reviewed and accepted by the patient.  She will be Covid tested Monday.          Lazaro Beltran MD  [facility] [place]"

## 2021-01-19 NOTE — PROGRESS NOTES
SUBJECTIVE:                                                   Anyi Recinos is a 60 year old female who presents to clinic today for the following health issue(s):  Patient presents with:  Pre-Op Exam: TREATMENT, VAGINA, USING CO2 LASER         HPI: The patient is seen at this time for preoperative clearance.  She has biopsy-proven vaginal dysplasia.  We have recommended laser treatment to the abnormal areas in the hospital under a light general anesthetic.  The patient understands the risk and complications of the procedure.      Patient's last menstrual period was 09/17/2007..     Patient is sexually active, No obstetric history on file..  Using menopause for contraception.    reports that she quit smoking about 39 years ago. She has a 3.00 pack-year smoking history. She has never used smokeless tobacco.    Health maintenance updated:  yes    Today's PHQ-2 Score:   PHQ-2 ( 1999 Pfizer) 12/15/2020   Q1: Little interest or pleasure in doing things 0   Q2: Feeling down, depressed or hopeless 0   PHQ-2 Score 0     Today's PHQ-9 Score:   PHQ-9 SCORE 11/25/2020   PHQ-9 Total Score 4     Today's HUEY-7 Score:   HUEY-7 SCORE 11/25/2020   Total Score 4       Problem list and histories reviewed & adjusted, as indicated.  Additional history: as documented.    Patient Active Problem List   Diagnosis     Prolapse of female pelvic organs     Cyst of left breast     Vaginal enterocele     Cystocele, midline     Routine general medical examination at a health care facility     Vaginal prolapse     Vaginal high risk human papillomavirus (HPV) DNA test positive     Papanicolaou smear of vagina with atypical squamous cells cannot exclude high grade squamous intraepithelial lesion (ASC-H)     Vaginal dysplasia     Past Surgical History:   Procedure Laterality Date     COLPORRHAPHY ANTERIOR, POSTERIOR, COMBINED  9/17/2013    Procedure: COMBINED COLPORRHAPHY ANTERIOR, POSTERIOR;  ANTERIOR AND POSTERIOR REPAIR, PERINEOPLASTY, MINI ARC  SLING (DR. STARK), DIAGNOSTIC LAPAROSCOPY AND LAPAROSCOPIC CHOLECYSTECTOMY (DR. LIU) ;  Surgeon: Augustus Stark MD;  Location:  OR     COSMETIC SURGERY      abdominoplasty     DAVINCI SACROCOLPOPEXY, MIDURETHRAL SLING, CYSTOSCOPY N/A 2016    Procedure: DAVINCI SACROCOLPOPEXY, MIDURETHRAL SLING, CYSTOSCOPY;  Surgeon: Don Abdullahi MD;  Location:  OR     GI SURGERY      lap band     GYN SURGERY      vag hysterectomy     GYN SURGERY  ,    c section     LAPAROSCOPIC CHOLECYSTECTOMY  2013    Procedure: LAPAROSCOPIC CHOLECYSTECTOMY;;  Surgeon: Mckinley Liu MD;  Location:  OR     LAPAROSCOPY DIAGNOSTIC (GENERAL)  2013    Procedure: LAPAROSCOPY DIAGNOSTIC (GENERAL);;  Surgeon: Mckinley Liu MD;  Location:  OR     LAPAROSCOPY, SURGICAL; REPAIR INCISIONAL OR VENTRAL HERNIA       MAMMOPLASTY REDUCTION BILATERAL       MASTOPEXY BILATERAL       SLING TRANSVAGINAL  2013    Procedure: SLING TRANSVAGINAL;;  Surgeon: Augustus Stark MD;  Location:  OR      Social History     Tobacco Use     Smoking status: Former Smoker     Packs/day: 0.50     Years: 6.00     Pack years: 3.00     Quit date: 1982     Years since quittin.0     Smokeless tobacco: Never Used   Substance Use Topics     Alcohol use: Yes     Comment: rare      Problem (# of Occurrences) Relation (Name,Age of Onset)    Gastrointestinal Disease (1) Father    Heart Disease (1) Father            Current Outpatient Medications   Medication Sig     estradiol (ESTRACE) 0.5 MG tablet Take 1 tablet (0.5 mg) by mouth daily     losartan (COZAAR) 50 MG tablet TK 1 T PO D     Probiotic Product (PROBIOTIC PO) Take 1 tablet by mouth daily     No current facility-administered medications for this visit.      No Known Allergies    ROS:  12 point review of systems negative other than symptoms noted below or in the HPI.  No urinary frequency or dysuria, bladder or kidney  "problems      OBJECTIVE:     /86   Ht 1.651 m (5' 5\")   Wt 105.7 kg (233 lb)   LMP 09/17/2007   Breastfeeding No   BMI 38.77 kg/m    Body mass index is 38.77 kg/m .    Exam:  Constitutional:  Appearance: Well nourished, well developed alert, in no acute distress  Chest:  Respiratory Effort:  Breathing unlabored. Clear to auscultation bilaterally.   Cardiovascular: Heart: Auscultation:  Regular rate, normal rhythm, no murmurs present  Gastrointestinal:  Abdominal Examination:  Abdomen nontender to palpation, tone normal without rigidity or guarding, no masses present, umbilicus without lesions; Liver/Spleen:  No hepatomegaly present, liver nontender to palpation; Hernias:  No hernias present  Lymphatic: Lymph Nodes:  No other lymphadenopathy present  Skin: General Inspection:  No rashes present, no lesions present, no areas of discoloration.  Neurologic:  Mental Status:  Oriented X3.  Normal strength and tone, sensory exam grossly normal, mentation intact and speech normal.    Psychiatric:  Mentation appears normal and affect normal/bright.  No Pelvic Exam performed     In-Clinic Test Results:      ASSESSMENT/PLAN:                                                        60-year-old with previous hysterectomy who now has vaginal dysplasia.  She is consented for a thorough colposcopic evaluation in the operating room with staining and laser treatment to dysplastic areas.  The risks and complications of been reviewed and accepted by the patient.  She will be Covid tested Monday.          Lazaro Beltran MD  [facility]"

## 2021-01-20 ENCOUNTER — OFFICE VISIT (OUTPATIENT)
Dept: OBGYN | Facility: CLINIC | Age: 61
End: 2021-01-20
Payer: COMMERCIAL

## 2021-01-20 ENCOUNTER — PATIENT OUTREACH (OUTPATIENT)
Dept: OBGYN | Facility: CLINIC | Age: 61
End: 2021-01-20

## 2021-01-20 VITALS
WEIGHT: 233 LBS | HEIGHT: 65 IN | DIASTOLIC BLOOD PRESSURE: 86 MMHG | SYSTOLIC BLOOD PRESSURE: 132 MMHG | BODY MASS INDEX: 38.82 KG/M2

## 2021-01-20 DIAGNOSIS — R87.811 VAGINAL HIGH RISK HUMAN PAPILLOMAVIRUS (HPV) DNA TEST POSITIVE: ICD-10-CM

## 2021-01-20 DIAGNOSIS — N89.3 VAGINAL DYSPLASIA: Primary | ICD-10-CM

## 2021-01-20 DIAGNOSIS — R87.621 PAPANICOLAOU SMEAR OF VAGINA WITH ATYPICAL SQUAMOUS CELLS CANNOT EXCLUDE HIGH GRADE SQUAMOUS INTRAEPITHELIAL LESION (ASC-H): ICD-10-CM

## 2021-01-20 PROCEDURE — 99213 OFFICE O/P EST LOW 20 MIN: CPT | Performed by: OBSTETRICS & GYNECOLOGY

## 2021-01-20 ASSESSMENT — MIFFLIN-ST. JEOR: SCORE: 1622.76

## 2021-01-20 NOTE — TELEPHONE ENCOUNTER
1/12/21 Vag Iva- VAIN 1 & 2. Vag. co-test- ASC-H, +HPV 18. Provider to discuss with pt  1/28/21 Vaginal Laser Co2 scheduled.

## 2021-01-20 NOTE — LETTER
July 12, 2021      Anyi Recinos  [address]  [place] [address]        Dear ,    This letter is to remind you that you are due for your follow-up Pap smear.    Please call [phone] to schedule your appointment at your earliest convenience.    If you have completed the appointment outside of the [facility] system, please have the records forwarded to our office. We will update your chart for your provider to review before your next annual wellness visit.     Thank you for choosing [facility]!      Sincerely,    Your [facility] Care Team

## 2021-01-25 ENCOUNTER — OFFICE VISIT (OUTPATIENT)
Dept: URGENT CARE | Facility: URGENT CARE | Age: 61
End: 2021-01-25
Payer: COMMERCIAL

## 2021-01-25 DIAGNOSIS — Z11.59 ENCOUNTER FOR SCREENING FOR OTHER VIRAL DISEASES: ICD-10-CM

## 2021-01-25 LAB
LABORATORY COMMENT REPORT: NORMAL
SARS-COV-2 RNA RESP QL NAA+PROBE: NEGATIVE
SARS-COV-2 RNA RESP QL NAA+PROBE: NORMAL
SPECIMEN SOURCE: NORMAL
SPECIMEN SOURCE: NORMAL

## 2021-01-25 PROCEDURE — U0005 INFEC AGEN DETEC AMPLI PROBE: HCPCS | Performed by: OBSTETRICS & GYNECOLOGY

## 2021-01-25 PROCEDURE — U0003 INFECTIOUS AGENT DETECTION BY NUCLEIC ACID (DNA OR RNA); SEVERE ACUTE RESPIRATORY SYNDROME CORONAVIRUS 2 (SARS-COV-2) (CORONAVIRUS DISEASE [COVID-19]), AMPLIFIED PROBE TECHNIQUE, MAKING USE OF HIGH THROUGHPUT TECHNOLOGIES AS DESCRIBED BY CMS-2020-01-R: HCPCS | Performed by: OBSTETRICS & GYNECOLOGY

## 2021-01-27 NOTE — H&P
Office Visit    1/20/2021  [facility] Lazaro Harmon MD  OB/Gyn  Vaginal dysplasia  Dx  Pre-Op Exam    Reason for Visit   Progress Notes             SUBJECTIVE:                                                   Anyi Recinos is a 60 year old female who presents to clinic today for the following health issue(s):  Patient presents with:  Pre-Op Exam: TREATMENT, VAGINA, USING CO2 LASER            HPI: The patient is seen at this time for preoperative clearance.  She has biopsy-proven vaginal dysplasia.  We have recommended laser treatment to the abnormal areas in the hospital under a light general anesthetic.  The patient understands the risk and complications of the procedure.        Patient's last menstrual period was 09/17/2007..      Patient is sexually active, No obstetric history on file..  Using menopause for contraception.    reports that she quit smoking about 39 years ago. She has a 3.00 pack-year smoking history. She has never used smokeless tobacco.     Health maintenance updated:  yes     Today's PHQ-2 Score:   PHQ-2 ( 1999 Pfizer) 12/15/2020   Q1: Little interest or pleasure in doing things 0   Q2: Feeling down, depressed or hopeless 0   PHQ-2 Score 0      Today's PHQ-9 Score:   PHQ-9 SCORE 11/25/2020   PHQ-9 Total Score 4      Today's HUEY-7 Score:   HUEY-7 SCORE 11/25/2020   Total Score 4         Problem list and histories reviewed & adjusted, as indicated.  Additional history: as documented.         Patient Active Problem List   Diagnosis     Prolapse of female pelvic organs     Cyst of left breast     Vaginal enterocele     Cystocele, midline     Routine general medical examination at a health care facility     Vaginal prolapse     Vaginal high risk human papillomavirus (HPV) DNA test positive     Papanicolaou smear of vagina with atypical squamous cells cannot exclude high grade squamous intraepithelial lesion (ASC-H)     Vaginal dysplasia             Past Surgical  History:   Procedure Laterality Date     COLPORRHAPHY ANTERIOR, POSTERIOR, COMBINED   2013     Procedure: COMBINED COLPORRHAPHY ANTERIOR, POSTERIOR;  ANTERIOR AND POSTERIOR REPAIR, PERINEOPLASTY, MINI ARC SLING (DR. STARK), DIAGNOSTIC LAPAROSCOPY AND LAPAROSCOPIC CHOLECYSTECTOMY (DR. LIU) ;  Surgeon: Augustus Stark MD;  Location:  OR     COSMETIC SURGERY        abdominoplasty     DAVINCI SACROCOLPOPEXY, MIDURETHRAL SLING, CYSTOSCOPY N/A 2016     Procedure: DAVINCI SACROCOLPOPEXY, MIDURETHRAL SLING, CYSTOSCOPY;  Surgeon: Don Abdullahi MD;  Location:  OR     GI SURGERY        lap band     GYN SURGERY        vag hysterectomy     GYN SURGERY   ,     c section     LAPAROSCOPIC CHOLECYSTECTOMY   2013     Procedure: LAPAROSCOPIC CHOLECYSTECTOMY;;  Surgeon: Mckinley Liu MD;  Location:  OR     LAPAROSCOPY DIAGNOSTIC (GENERAL)   2013     Procedure: LAPAROSCOPY DIAGNOSTIC (GENERAL);;  Surgeon: Mckinley Liu MD;  Location:  OR     LAPAROSCOPY, SURGICAL; REPAIR INCISIONAL OR VENTRAL HERNIA         MAMMOPLASTY REDUCTION BILATERAL         MASTOPEXY BILATERAL         SLING TRANSVAGINAL   2013     Procedure: SLING TRANSVAGINAL;;  Surgeon: Augustus Stark MD;  Location:  OR       Social History            Tobacco Use     Smoking status: Former Smoker       Packs/day: 0.50       Years: 6.00       Pack years: 3.00       Quit date: 1982       Years since quittin.0     Smokeless tobacco: Never Used   Substance Use Topics     Alcohol use: Yes       Comment: rare       Problem (# of Occurrences) Relation (Name,Age of Onset)     Gastrointestinal Disease (1) Father     Heart Disease (1) Father                   Current Outpatient Medications   Medication Sig     estradiol (ESTRACE) 0.5 MG tablet Take 1 tablet (0.5 mg) by mouth daily     losartan (COZAAR) 50 MG tablet TK 1 T PO D     Probiotic Product (PROBIOTIC PO) Take 1 tablet  "by mouth daily      No current facility-administered medications for this visit.       No Known Allergies     ROS:  12 point review of systems negative other than symptoms noted below or in the HPI.  No urinary frequency or dysuria, bladder or kidney problems        OBJECTIVE:      /86   Ht 1.651 m (5' 5\")   Wt 105.7 kg (233 lb)   LMP 09/17/2007   Breastfeeding No   BMI 38.77 kg/m    Body mass index is 38.77 kg/m .     Exam:  Constitutional:  Appearance: Well nourished, well developed alert, in no acute distress  Chest:  Respiratory Effort:  Breathing unlabored. Clear to auscultation bilaterally.   Cardiovascular: Heart: Auscultation:  Regular rate, normal rhythm, no murmurs present  Gastrointestinal:  Abdominal Examination:  Abdomen nontender to palpation, tone normal without rigidity or guarding, no masses present, umbilicus without lesions; Liver/Spleen:  No hepatomegaly present, liver nontender to palpation; Hernias:  No hernias present  Lymphatic: Lymph Nodes:  No other lymphadenopathy present  Skin: General Inspection:  No rashes present, no lesions present, no areas of discoloration.  Neurologic:  Mental Status:  Oriented X3.  Normal strength and tone, sensory exam grossly normal, mentation intact and speech normal.    Psychiatric:  Mentation appears normal and affect normal/bright.  No Pelvic Exam performed      In-Clinic Test Results:        ASSESSMENT/PLAN:                                                          60-year-old with previous hysterectomy who now has vaginal dysplasia.  She is consented for a thorough colposcopic evaluation in the operating room with staining and laser treatment to dysplastic areas.  The risks and complications of been reviewed and accepted by the patient.  She will be Covid tested Monday.              Lazaro Beltran MD  [facility] [place]      Instructions     After Visit Summary (Automatic SnapShot taken 1/20/2021)  Additional " "Documentation    Vitals:    /86   Ht 1.651 m (5' 5\")   Wt 105.7 kg (233 lb)   LMP 09/17/2007   Breastfeeding No   BMI 38.77 kg/m    BSA 2.2 m    Flowsheets:    Vitals Reassessment,   NICU VS,   Anthropometrics,   Lactation      Encounter Info:    Billing Info,   History,   Allergies,   Detailed Report      AVS Reports    Date/Time Report Action User   1/20/2021  1:53 PM After Visit Summary Automatically Generated Lazaro Beltran MD   Encounter Information     Provider Department Encounter # Center   1/20/2021 1:30 PM Lazaro Beltran MD [facility] [id number] [facility]   Reviewed this Encounter     Medications Problems Allergies History   Mounika Robledo MA   Reviewed ADL, Alcohol, Drug Use, Family, Medical, Sexual Activity, Surgical, Tobacco   Communicable/Travel screen    Communicable/Travel Screening 12/15/2020 1/6/2021 1/12/2021 1/20/2021 1/25/2021   In the last month, have you been in contact with someone who was confirmed or suspected to have Coronavirus / COVID-19? No / Unsure No / Unsure No / Unsure No / Unsure Unable to assess   Do you have any of the following symptoms? None of these None of these None of these None of these Unable to assess   Have you traveled internationally in the last month? No No No No Unable to assess   View Complete Flowsheet ...More Data Exists   Linked Episodes     Pap Tracking Noted 6/13/2017     Orders Placed     None  Medication Changes       None     Medication List   Visit Diagnoses       Vaginal dysplasia     Problem List       "

## 2021-01-28 ENCOUNTER — ANESTHESIA EVENT (OUTPATIENT)
Dept: SURGERY | Facility: CLINIC | Age: 61
End: 2021-01-28
Payer: COMMERCIAL

## 2021-01-28 ENCOUNTER — HOSPITAL ENCOUNTER (OUTPATIENT)
Facility: CLINIC | Age: 61
Discharge: HOME | End: 2021-01-28
Attending: OBSTETRICS & GYNECOLOGY | Admitting: OBSTETRICS & GYNECOLOGY
Payer: COMMERCIAL

## 2021-01-28 ENCOUNTER — ANESTHESIA (OUTPATIENT)
Dept: SURGERY | Facility: CLINIC | Age: 61
End: 2021-01-28
Payer: COMMERCIAL

## 2021-01-28 ENCOUNTER — SURGERY (OUTPATIENT)
Age: 61
End: 2021-01-28
Payer: COMMERCIAL

## 2021-01-28 VITALS
RESPIRATION RATE: 16 BRPM | OXYGEN SATURATION: 95 % | WEIGHT: 234 LBS | HEART RATE: 59 BPM | DIASTOLIC BLOOD PRESSURE: 91 MMHG | BODY MASS INDEX: 38.94 KG/M2 | TEMPERATURE: 96.8 F | SYSTOLIC BLOOD PRESSURE: 144 MMHG

## 2021-01-28 DIAGNOSIS — N89.3 VAGINAL DYSPLASIA: ICD-10-CM

## 2021-01-28 PROCEDURE — 258N000003 HC RX IP 258 OP 636: Performed by: NURSE ANESTHETIST, CERTIFIED REGISTERED

## 2021-01-28 PROCEDURE — 710N000012 HC RECOVERY PHASE 2, PER MINUTE: Performed by: OBSTETRICS & GYNECOLOGY

## 2021-01-28 PROCEDURE — 250N000009 HC RX 250: Performed by: NURSE ANESTHETIST, CERTIFIED REGISTERED

## 2021-01-28 PROCEDURE — 272N000001 HC OR GENERAL SUPPLY STERILE: Performed by: OBSTETRICS & GYNECOLOGY

## 2021-01-28 PROCEDURE — 250N000011 HC RX IP 250 OP 636: Performed by: NURSE ANESTHETIST, CERTIFIED REGISTERED

## 2021-01-28 PROCEDURE — 250N000025 HC SEVOFLURANE, PER MIN: Performed by: OBSTETRICS & GYNECOLOGY

## 2021-01-28 PROCEDURE — 710N000009 HC RECOVERY PHASE 1, LEVEL 1, PER MIN: Performed by: OBSTETRICS & GYNECOLOGY

## 2021-01-28 PROCEDURE — 360N000076 HC SURGERY LEVEL 3, PER MIN: Performed by: OBSTETRICS & GYNECOLOGY

## 2021-01-28 PROCEDURE — 370N000017 HC ANESTHESIA TECHNICAL FEE, PER MIN: Performed by: OBSTETRICS & GYNECOLOGY

## 2021-01-28 PROCEDURE — 58999 UNLISTED PX FML GENITAL SYS: CPT | Performed by: OBSTETRICS & GYNECOLOGY

## 2021-01-28 PROCEDURE — 999N000141 HC STATISTIC PRE-PROCEDURE NURSING ASSESSMENT: Performed by: OBSTETRICS & GYNECOLOGY

## 2021-01-28 RX ORDER — MEPERIDINE HYDROCHLORIDE 25 MG/ML
12.5 INJECTION INTRAMUSCULAR; INTRAVENOUS; SUBCUTANEOUS
Status: DISCONTINUED | OUTPATIENT
Start: 2021-01-28 | End: 2021-01-28 | Stop reason: HOSPADM

## 2021-01-28 RX ORDER — NALOXONE HYDROCHLORIDE 0.4 MG/ML
0.2 INJECTION, SOLUTION INTRAMUSCULAR; INTRAVENOUS; SUBCUTANEOUS
Status: DISCONTINUED | OUTPATIENT
Start: 2021-01-28 | End: 2021-01-28 | Stop reason: HOSPADM

## 2021-01-28 RX ORDER — NALOXONE HYDROCHLORIDE 0.4 MG/ML
0.4 INJECTION, SOLUTION INTRAMUSCULAR; INTRAVENOUS; SUBCUTANEOUS
Status: DISCONTINUED | OUTPATIENT
Start: 2021-01-28 | End: 2021-01-28 | Stop reason: HOSPADM

## 2021-01-28 RX ORDER — SODIUM CHLORIDE, SODIUM LACTATE, POTASSIUM CHLORIDE, CALCIUM CHLORIDE 600; 310; 30; 20 MG/100ML; MG/100ML; MG/100ML; MG/100ML
INJECTION, SOLUTION INTRAVENOUS CONTINUOUS
Status: DISCONTINUED | OUTPATIENT
Start: 2021-01-28 | End: 2021-01-28 | Stop reason: HOSPADM

## 2021-01-28 RX ORDER — SODIUM CHLORIDE, SODIUM LACTATE, POTASSIUM CHLORIDE, CALCIUM CHLORIDE 600; 310; 30; 20 MG/100ML; MG/100ML; MG/100ML; MG/100ML
INJECTION, SOLUTION INTRAVENOUS CONTINUOUS PRN
Status: DISCONTINUED | OUTPATIENT
Start: 2021-01-28 | End: 2021-01-28

## 2021-01-28 RX ORDER — PHENAZOPYRIDINE HYDROCHLORIDE 200 MG/1
200 TABLET, FILM COATED ORAL ONCE
Status: DISCONTINUED | OUTPATIENT
Start: 2021-01-28 | End: 2021-01-28 | Stop reason: CLARIF

## 2021-01-28 RX ORDER — DEXAMETHASONE SODIUM PHOSPHATE 4 MG/ML
INJECTION, SOLUTION INTRA-ARTICULAR; INTRALESIONAL; INTRAMUSCULAR; INTRAVENOUS; SOFT TISSUE PRN
Status: DISCONTINUED | OUTPATIENT
Start: 2021-01-28 | End: 2021-01-28

## 2021-01-28 RX ORDER — ONDANSETRON 4 MG/1
4 TABLET, ORALLY DISINTEGRATING ORAL EVERY 30 MIN PRN
Status: DISCONTINUED | OUTPATIENT
Start: 2021-01-28 | End: 2021-01-28 | Stop reason: HOSPADM

## 2021-01-28 RX ORDER — FENTANYL CITRATE 0.05 MG/ML
25-50 INJECTION, SOLUTION INTRAMUSCULAR; INTRAVENOUS
Status: DISCONTINUED | OUTPATIENT
Start: 2021-01-28 | End: 2021-01-28 | Stop reason: HOSPADM

## 2021-01-28 RX ORDER — KETOROLAC TROMETHAMINE 30 MG/ML
INJECTION, SOLUTION INTRAMUSCULAR; INTRAVENOUS PRN
Status: DISCONTINUED | OUTPATIENT
Start: 2021-01-28 | End: 2021-01-28

## 2021-01-28 RX ORDER — FENTANYL CITRATE 50 UG/ML
INJECTION, SOLUTION INTRAMUSCULAR; INTRAVENOUS PRN
Status: DISCONTINUED | OUTPATIENT
Start: 2021-01-28 | End: 2021-01-28

## 2021-01-28 RX ORDER — PROPOFOL 10 MG/ML
INJECTION, EMULSION INTRAVENOUS PRN
Status: DISCONTINUED | OUTPATIENT
Start: 2021-01-28 | End: 2021-01-28

## 2021-01-28 RX ORDER — HYDROMORPHONE HYDROCHLORIDE 1 MG/ML
.3-.5 INJECTION, SOLUTION INTRAMUSCULAR; INTRAVENOUS; SUBCUTANEOUS EVERY 10 MIN PRN
Status: DISCONTINUED | OUTPATIENT
Start: 2021-01-28 | End: 2021-01-28 | Stop reason: HOSPADM

## 2021-01-28 RX ORDER — HYDROCODONE BITARTRATE AND ACETAMINOPHEN 5; 325 MG/1; MG/1
1-2 TABLET ORAL EVERY 4 HOURS PRN
Qty: 10 TABLET | Refills: 0 | Status: SHIPPED | OUTPATIENT
Start: 2021-01-28 | End: 2021-11-09

## 2021-01-28 RX ORDER — LIDOCAINE HYDROCHLORIDE 20 MG/ML
INJECTION, SOLUTION INFILTRATION; PERINEURAL PRN
Status: DISCONTINUED | OUTPATIENT
Start: 2021-01-28 | End: 2021-01-28

## 2021-01-28 RX ORDER — ONDANSETRON 2 MG/ML
INJECTION INTRAMUSCULAR; INTRAVENOUS PRN
Status: DISCONTINUED | OUTPATIENT
Start: 2021-01-28 | End: 2021-01-28

## 2021-01-28 RX ORDER — ONDANSETRON 2 MG/ML
4 INJECTION INTRAMUSCULAR; INTRAVENOUS EVERY 30 MIN PRN
Status: DISCONTINUED | OUTPATIENT
Start: 2021-01-28 | End: 2021-01-28 | Stop reason: HOSPADM

## 2021-01-28 RX ORDER — HYDROCODONE BITARTRATE AND ACETAMINOPHEN 5; 325 MG/1; MG/1
1 TABLET ORAL
Status: DISCONTINUED | OUTPATIENT
Start: 2021-01-28 | End: 2021-01-28 | Stop reason: HOSPADM

## 2021-01-28 RX ADMIN — PHENYLEPHRINE HYDROCHLORIDE 100 MCG: 10 INJECTION INTRAVENOUS at 09:21

## 2021-01-28 RX ADMIN — FENTANYL CITRATE 50 MCG: 50 INJECTION, SOLUTION INTRAMUSCULAR; INTRAVENOUS at 09:00

## 2021-01-28 RX ADMIN — ONDANSETRON 4 MG: 2 INJECTION INTRAMUSCULAR; INTRAVENOUS at 09:09

## 2021-01-28 RX ADMIN — LIDOCAINE HYDROCHLORIDE 100 MG: 20 INJECTION, SOLUTION INFILTRATION; PERINEURAL at 09:00

## 2021-01-28 RX ADMIN — SODIUM CHLORIDE, POTASSIUM CHLORIDE, SODIUM LACTATE AND CALCIUM CHLORIDE: 600; 310; 30; 20 INJECTION, SOLUTION INTRAVENOUS at 08:58

## 2021-01-28 RX ADMIN — PROPOFOL 20 MG: 10 INJECTION, EMULSION INTRAVENOUS at 09:14

## 2021-01-28 RX ADMIN — KETOROLAC TROMETHAMINE 30 MG: 30 INJECTION, SOLUTION INTRAMUSCULAR at 09:22

## 2021-01-28 RX ADMIN — DEXAMETHASONE SODIUM PHOSPHATE 4 MG: 4 INJECTION, SOLUTION INTRA-ARTICULAR; INTRALESIONAL; INTRAMUSCULAR; INTRAVENOUS; SOFT TISSUE at 09:09

## 2021-01-28 RX ADMIN — PROPOFOL 50 MG: 10 INJECTION, EMULSION INTRAVENOUS at 09:12

## 2021-01-28 RX ADMIN — FENTANYL CITRATE 50 MCG: 50 INJECTION, SOLUTION INTRAMUSCULAR; INTRAVENOUS at 09:13

## 2021-01-28 RX ADMIN — MIDAZOLAM 2 MG: 1 INJECTION INTRAMUSCULAR; INTRAVENOUS at 08:58

## 2021-01-28 RX ADMIN — SUCCINYLCHOLINE CHLORIDE 100 MG: 20 INJECTION, SOLUTION INTRAMUSCULAR; INTRAVENOUS; PARENTERAL at 09:00

## 2021-01-28 RX ADMIN — PROPOFOL 200 MG: 10 INJECTION, EMULSION INTRAVENOUS at 09:00

## 2021-01-28 ASSESSMENT — ENCOUNTER SYMPTOMS
DYSRHYTHMIAS: 0
SEIZURES: 0

## 2021-01-28 ASSESSMENT — LIFESTYLE VARIABLES: TOBACCO_USE: 0

## 2021-01-28 NOTE — ANESTHESIA PROCEDURE NOTES
Airway   Date/Time: 1/28/2021 9:02 AM   Patient location during procedure: OR  Staff -   Anesthesiologist:  Joe Stein MD  CRNA: Harley Chan APRN CRNA  Performed By: CRNA    Consent for Airway   Urgency: elective    Indications and Patient Condition  Indications for airway management: halina-procedural  Induction type:RSIMask difficulty assessment: 0 - not attempted    Final Airway Details  Final airway type: endotracheal airway  Successful airway:ETT - single and Oral  Endotracheal Airway Details   ETT size (mm): 7.0  Cuffed: yes  Successful intubation technique: video laryngoscopy  Grade View of Cords: 1  Adjucts: stylet  Measured from: lips  Secured at (cm): 22  Secured with: pink tape  Bite block used: None    Post intubation assessment   Placement verified by: capnometry, equal breath sounds and chest rise   Number of attempts at approach: 1  Number of other approaches attempted: 0  Secured with:pink tape  Ease of procedure: easy  Dentition: Unchanged and Intact

## 2021-01-28 NOTE — ANESTHESIA CARE TRANSFER NOTE
Patient: Anyi Recinos    Procedure(s):  TREATMENT, VAGINA, USING CO2 LASER    Diagnosis: Vaginal dysplasia [N89.3]  Diagnosis Additional Information: No value filed.    Anesthesia Type:   General     Note:    Oropharynx: oropharynx clear of all foreign objects  Level of Consciousness: drowsy  Oxygen Supplementation: face mask  Level of Supplemental Oxygen: 6  Independent Airway: airway patency satisfactory and stable  Dentition: dentition unchanged  Vital Signs Stable: post-procedure vital signs reviewed and stable  Report to RN Given: handoff report given  Patient transferred to: PACU    Handoff Report: Identifed the Patient, Identified the Reponsible Provider, Reviewed the pertinent medical history, Discussed the surgical course, Reviewed Intra-OP anesthesia mangement and issues during anesthesia, Set expectations for post-procedure period and Allowed opportunity for questions and acknowledgement of understanding      Vitals: (Last set prior to Anesthesia Care Transfer)  CRNA VITALS  1/28/2021 0903 - 1/28/2021 0937      1/28/2021             NIBP:  115/63    NIBP Mean:  77        Electronically Signed By: KLAUDIA Davies CRNA  January 28, 2021  9:37 AM

## 2021-01-28 NOTE — ANESTHESIA POSTPROCEDURE EVALUATION
Patient: Anyi Recinos    Procedure(s):  TREATMENT, VAGINA, USING CO2 LASER    Diagnosis:Vaginal dysplasia [N89.3]  Diagnosis Additional Information: No value filed.    Anesthesia Type:  General    Note:  Disposition: Outpatient   Postop Pain Control: Uneventful            Sign Out: Well controlled pain   PONV: No   Neuro/Psych: Uneventful            Sign Out: Acceptable/Baseline neuro status   Airway/Respiratory: Uneventful            Sign Out: Acceptable/Baseline resp. status   CV/Hemodynamics: Uneventful            Sign Out: Acceptable CV status   Other NRE: NONE   DID A NON-ROUTINE EVENT OCCUR? No         Last vitals:  Vitals:    01/28/21 1030 01/28/21 1045 01/28/21 1158   BP: 115/60 112/64 (!) 144/91   Pulse: 57 59    Resp: 13 14 16   Temp: 36.1  C (97  F) 36  C (96.8  F)    SpO2: 93% 93% 95%       Electronically Signed By: Joe Stein MD  January 28, 2021  12:40 PM

## 2021-01-28 NOTE — DISCHARGE INSTRUCTIONS
Same Day Surgery Discharge Instructions for  Sedation and General Anesthesia       It's not unusual to feel dizzy, light-headed or faint for up to 24 hours after surgery or while taking pain medication.  If you have these symptoms: sit for a few minutes before standing and have someone assist you when you get up to walk or use the bathroom.      You should rest and relax for the next 24 hours. We recommend you make arrangements to have an adult stay with you for at least 24 hours after your discharge.  Avoid hazardous and strenuous activity.      DO NOT DRIVE any vehicle or operate mechanical equipment for 24 hours following the end of your surgery.  Even though you may feel normal, your reactions may be affected by the medication you have received.      Do not drink alcoholic beverages for 24 hours following surgery.       Slowly progress to your regular diet as you feel able. It's not unusual to feel nauseated and/or vomit after receiving anesthesia.  If you develop these symptoms, drink clear liquids (apple juice, ginger ale, broth, 7-up, etc. ) until you feel better.  If your nausea and vomiting persists for 24 hours, please notify your surgeon.        All narcotic pain medications, along with inactivity and anesthesia, can cause constipation. Drinking plenty of liquids and increasing fiber intake will help.      For any questions of a medical nature, call your surgeon.      Do not make important decisions for 24 hours.      If you had general anesthesia, you may have a sore throat for a couple of days related to the breathing tube used during surgery.  You may use Cepacol lozenges to help with this discomfort.  If it worsens or if you develop a fever, contact your surgeon.       If you feel your pain is not well managed with the pain medications prescribed by your surgeon, please contact your surgeon's office to let them know so they can address your concerns.       CoVid 19 Information    We want to give you  information regarding Covid. Please consult your primary care provider with any questions you might have.     Patient who have symptoms (cough, fever, or shortness of breath), need to isolate for 7 days from when symptoms started OR 72 hours after fever resolves (without fever reducing medications) AND improvement of respiratory symptoms (whichever is longer).      Isolate yourself at home (in own room/own bathroom if possible)    Do Not allow any visitors    Do Not go to work or school    Do Not go to [employer],  centers, shopping, or other public places.    Do Not shake hands.    Avoid close and intimate contact with others (hugging, kissing).    Follow CDC recommendations for household cleaning of frequently touched services.     After the initial 7 days, continue to isolate yourself from household members as much as possible. To continue decrease the risk of community spread and exposure, you and any members of your household should limit activities in public for 14 days after starting home isolation.     You can reference the following CDC link for helpful home isolation/care tips:  https://www.cdc.gov/coronavirus/2019-ncov/downloads/10Things.pdf    Protect Others:    Cover Your Mouth and Nose with a mask, disposable tissue or wash cloth to avoid spreading germs to others.    Wash your hands and face frequently with soap and water    Call Your Primary Doctor If: Breathing difficulty develops or you become worse.    For more information about COVID19 and options for caring for yourself at home, please visit the CDC website at https://www.cdc.gov/coronavirus/2019-ncov/about/steps-when-sick.html  For more options for care at [facility], please visit our website at [email or web address]    Today you received Toradol, an antiinflammatory medication similar to Ibuprofen.  You should not take other antiinflammatory medication, such as Ibuprofen, Motrin, Advil, Aleve, Naprosyn,  etc until 3:30 pm.       **If you have questions or concerns about your procedure,   call Dr. Beltran at [phone]**

## 2021-01-28 NOTE — ANESTHESIA PREPROCEDURE EVALUATION
Anesthesia Pre-Procedure Evaluation    Patient: Anyi Recinos   MRN: [id number] : 1960        Preoperative Diagnosis: Vaginal dysplasia [N89.3]   Procedure : Procedure(s):  TREATMENT, VAGINA, USING CO2 LASER     Past Medical History:   Diagnosis Date     Abnormal vaginal Pap smear 2020     BMI 40.0-44.9, adult (H)      Cervical high risk HPV (human papillomavirus) test positive 2017: NIL pap, + HR HPV (not 16 or 18) result.      GERD (gastroesophageal reflux disease)      Hypertension       Past Surgical History:   Procedure Laterality Date     COLPORRHAPHY ANTERIOR, POSTERIOR, COMBINED  2013    Procedure: COMBINED COLPORRHAPHY ANTERIOR, POSTERIOR;  ANTERIOR AND POSTERIOR REPAIR, PERINEOPLASTY, MINI ARC SLING (DR. STARK), DIAGNOSTIC LAPAROSCOPY AND LAPAROSCOPIC CHOLECYSTECTOMY (DR. LIU) ;  Surgeon: Augustus Stark MD;  Location:  OR     COSMETIC SURGERY      abdominoplasty     DAVINCI SACROCOLPOPEXY, MIDURETHRAL SLING, CYSTOSCOPY N/A 2016    Procedure: DAVINCI SACROCOLPOPEXY, MIDURETHRAL SLING, CYSTOSCOPY;  Surgeon: Don Abdullahi MD;  Location:  OR     GI SURGERY      lap band     GYN SURGERY      vag hysterectomy     GYN SURGERY  ,    c section     LAPAROSCOPIC CHOLECYSTECTOMY  2013    Procedure: LAPAROSCOPIC CHOLECYSTECTOMY;;  Surgeon: Mckinley Liu MD;  Location:  OR     LAPAROSCOPY DIAGNOSTIC (GENERAL)  2013    Procedure: LAPAROSCOPY DIAGNOSTIC (GENERAL);;  Surgeon: Mckinley Liu MD;  Location:  OR     LAPAROSCOPY, SURGICAL; REPAIR INCISIONAL OR VENTRAL HERNIA       MAMMOPLASTY REDUCTION BILATERAL       MASTOPEXY BILATERAL       SLING TRANSVAGINAL  2013    Procedure: SLING TRANSVAGINAL;;  Surgeon: Augustus Stark MD;  Location: [facility]      No Known Allergies   Social History     Tobacco Use     Smoking status: Former Smoker     Packs/day: 0.50     Years: 6.00     Pack years: 3.00      Quit date: 1982     Years since quittin.0     Smokeless tobacco: Never Used   Substance Use Topics     Alcohol use: Yes     Comment: rare      Wt Readings from Last 1 Encounters:   21 106.1 kg (234 lb)        Anesthesia Evaluation   Pt has had prior anesthetic. Type: General.    No history of anesthetic complications       ROS/MED HX  ENT/Pulmonary:     (+) Intermittent, asthma  (-) tobacco use and sleep apnea   Neurologic:     (+) no peripheral neuropathy  (-) no seizures and no CVA   Cardiovascular:     (+) hypertension----- (-) CAD and arrhythmias   METS/Exercise Tolerance:     Hematologic:       Musculoskeletal:       GI/Hepatic:     (+) GERD, Symptomatic,     Renal/Genitourinary:    (-) renal disease   Endo:     (+) Obesity,  (-) Type II DM and thyroid disease   Psychiatric/Substance Use:       Infectious Disease:    (-) Recent Fever   Malignancy:       Other:            Physical Exam    Airway  airway exam normal      Mallampati: III   TM distance: > 3 FB   Neck ROM: full   Mouth opening: > 3 cm    Respiratory Devices and Support         Dental  no notable dental history         Cardiovascular   cardiovascular exam normal          Pulmonary   pulmonary exam normal                OUTSIDE LABS:  CBC:   Lab Results   Component Value Date    HGB 13.3 2016    HGB 11.6 (L) 2015     BMP:   Lab Results   Component Value Date     2016    POTASSIUM 4.0 2016    POTASSIUM 3.8 2013    CHLORIDE 110 (H) 2016    CO2 24 2016    BUN 7 2016    CR 0.74 2016    CR 0.65 2013     (H) 2016     (H) 2013     COAGS: No results found for: PTT, INR, FIBR  POC:   Lab Results   Component Value Date     (H) 2013    HCG Negative 2007     HEPATIC: No results found for: ALBUMIN, PROTTOTAL, ALT, AST, GGT, ALKPHOS, BILITOTAL, BILIDIRECT, STEVE  OTHER:   Lab Results   Component Value Date    PIETRO 8.2 (L) 2016        Anesthesia Plan     History & Physical Review      ASA Status:  2. NPO Status:  NPO Appropriate. .  Plan for General with Intravenous induction. Device: ETT Maintenance will be Balanced.     Additional equipment: Videolaryngoscope.    PONV prophylaxis:  Ondansetron (or other 5HT-3), Dexamethasone or Solumedrol and Background Propofol Infusion.       Consents  Anesthesia Plan(s) and associated risks, benefits, and realistic alternatives discussed.    Questions answered and patient/representative(s) expressed understanding.    Discussed with:  Patient.             Postoperative Care  Postoperative pain management: IV analgesics and Oral pain medications.           Joe Stein MD

## 2021-01-28 NOTE — BRIEF OP NOTE
[facility]    Brief Operative Note    Pre-operative diagnosis: Vaginal dysplasia [N89.3]  Post-operative diagnosis same    Procedure: Procedure(s):  TREATMENT, VAGINA, USING CO2 LASER  Surgeon: Surgeon(s) and Role:     * Lazaro Beltran MD - Primary  Anesthesia: General   Estimated blood loss: None  Drains: None  Specimens:none  Findings:  Apical dysplasia  Complications: None.

## 2021-01-28 NOTE — OP NOTE
Procedure Date: 2021      REASON FOR ADMISSION:  Vaginal dysplasia.      PREOPERATIVE STATUS:  The patient is a 60-year-old with a previous hysterectomy.  She had a recent Pap smear that was abnormal and colposcopy with directed biopsies showed high-grade dysplasia.  She is consented at this time for laser vaporization of the abnormal areas of the vagina after colposcopy.      OPERATIVE PROCEDURE:  Colposcopy, CO2 laser vaporization of vaginal dysplasia.      OPERATIVE FINDINGS:  The patient had nonstaining areas that lined up with her biopsy sites at the vaginal apex.  This was difficult, as she has a very large, bulging rectocele.      DESCRIPTION OF PROCEDURE:  After general anesthesia was induced, the patient was placed in the dorsal lithotomy position and prepped and draped in the usual fashion.  The colposcope was brought in and an evacuation speculum placed.  We colposcoped the entire upper vaginal vault.  Lugol staining was instilled.  The CO2 laser was attached to the colposcope and under direct vision, vaporization at 15 lubin defocused beam was accomplished with all nonstaining areas.  The patient tolerated this very well.  There was no blood loss.      The patient went to the recovery room in satisfactory condition.  She was given Toradol at this time.  She will be discharged with Vicodin as needed.  She is asked to call for any fever, chills, change in bowel or bladder function.  She will be seen in the office in 2 weeks for a postoperative check.         MIRTHA BARRAGAN JR, MD             D: 2021   T: 2021   MT: SUSY      Name:     MARKY TABARES   MRN:      [id number]        Account:        [id number]   :      1960           Procedure Date: 2021      Document: [id number]

## 2021-02-10 ENCOUNTER — OFFICE VISIT (OUTPATIENT)
Dept: OBGYN | Facility: CLINIC | Age: 61
End: 2021-02-10
Payer: COMMERCIAL

## 2021-02-10 VITALS — BODY MASS INDEX: 38.99 KG/M2 | HEIGHT: 65 IN | WEIGHT: 234 LBS

## 2021-02-10 DIAGNOSIS — Z09 POSTOP CHECK: Primary | ICD-10-CM

## 2021-02-10 PROCEDURE — 99024 POSTOP FOLLOW-UP VISIT: CPT | Performed by: OBSTETRICS & GYNECOLOGY

## 2021-02-10 ASSESSMENT — MIFFLIN-ST. JEOR: SCORE: 1627.3

## 2021-02-10 NOTE — PROGRESS NOTES
The patient is seen at this time for postoperative check.  She was treated for vaginal dysplasia with surface laser vaporization on January 28.  She has had a normal-appearing discharge and no bleeding.  Examination shows excellent healing in the field of surface vaporization at the apex and anteriorly.  We reviewed all of the things that she can do to try to help her immune system keep this in check.  She will return in 6 months for repeat Pap smear.

## 2021-03-24 ENCOUNTER — IMMUNIZATION (OUTPATIENT)
Dept: PEDIATRICS | Facility: CLINIC | Age: 61
End: 2021-03-24
Payer: COMMERCIAL

## 2021-03-24 PROCEDURE — 91300 PR COVID VAC PFIZER DIL RECON 30 MCG/0.3 ML IM: CPT

## 2021-03-24 PROCEDURE — 0001A PR COVID VAC PFIZER DIL RECON 30 MCG/0.3 ML IM: CPT

## 2021-04-14 ENCOUNTER — IMMUNIZATION (OUTPATIENT)
Dept: PEDIATRICS | Facility: CLINIC | Age: 61
End: 2021-04-14
Attending: INTERNAL MEDICINE
Payer: COMMERCIAL

## 2021-04-14 PROCEDURE — 91300 PR COVID VAC PFIZER DIL RECON 30 MCG/0.3 ML IM: CPT

## 2021-04-14 PROCEDURE — 0002A PR COVID VAC PFIZER DIL RECON 30 MCG/0.3 ML IM: CPT

## 2021-09-11 DIAGNOSIS — N95.1 SYMPTOMATIC MENOPAUSAL OR FEMALE CLIMACTERIC STATES: ICD-10-CM

## 2021-09-13 RX ORDER — ESTRADIOL 0.5 MG/1
TABLET ORAL
Qty: 30 TABLET | Refills: 0 | Status: SHIPPED | OUTPATIENT
Start: 2021-09-13

## 2021-09-13 NOTE — TELEPHONE ENCOUNTER
"Requested Prescriptions   Pending Prescriptions Disp Refills     estradiol (ESTRACE) 0.5 MG tablet [Pharmacy Med Name: ESTRADIOL 0.5MG TABLETS] 90 tablet 3     Sig: TAKE 1 TABLET(0.5 MG) BY MOUTH DAILY       Hormone Replacement Therapy Failed - 9/11/2021  2:27 PM        Failed - Blood pressure under 140/90 in past 12 months     BP Readings from Last 3 Encounters:   01/28/21 (!) 144/91   01/20/21 132/86   01/12/21 132/76                 Passed - Recent (12 mo) or future (30 days) visit within the authorizing provider's specialty     Patient has had an office visit with the authorizing provider or a provider within the authorizing providers department within the previous 12 mos or has a future within next 30 days. See \"Patient Info\" tab in inbasket, or \"Choose Columns\" in Meds & Orders section of the refill encounter.              Passed - Patient has mammogram in past 2 years on file if age 50-75        Passed - Medication is active on med list        Passed - Patient is 18 years of age or older        Passed - No active pregnancy on record        Passed - No positive pregnancy test on record in past 12 months           Last Written Prescription Date:  11/27/20  Last Fill Quantity: 90,  # refills: 3   Last office visit: 2/10/2021 with prescribing provider:  Devin   Future Office Visit:  none    Refill approved for one month  Appointment needed for further refills  Zarina Restrepo RN on 9/13/2021 at 10:41 AM        "

## 2021-10-04 ENCOUNTER — TRANSFERRED RECORDS (OUTPATIENT)
Dept: MULTI SPECIALTY CLINIC | Facility: CLINIC | Age: 61
End: 2021-10-04
Payer: COMMERCIAL

## 2021-10-04 LAB
HPV ABSTRACT: NORMAL
PAP-ABSTRACT: ABNORMAL

## 2021-11-05 NOTE — PROGRESS NOTES
"INDICATIONS:                                                    Is a pregnancy test required: No.  Was a consent obtained?  Yes    Today's PHQ-2 Score:   PHQ-2 ( 1999 [employer]) 11/9/2021   Q1: Little interest or pleasure in doing things 0   Q2: Feeling down, depressed or hopeless 0   PHQ-2 Score 0     Today's PHQ-9 Score:    PHQ-9 SCORE 11/25/2020   PHQ-9 Total Score 4       Anyi Recinos, is a 61 year old female, who had a recent ASCUS pap.  HPV 18 positive Yes prior history of abnormal pap. Here today for colposcopy. Discussed indication, risks of infection and bleeding.    Her last pap was   Lab Results   Component Value Date    PAP ASC-H 01/12/2021    .    PROCEDURE:                                                      Cervix is stained with acetic acid and viewed colposcopically. Squamocolumnar junction {IS/IS NOT:9024} visualized in it's entirety. {:728} . Biopsy done {YES / NO:373646::\"Yes\"}. Endocervical curretage {DONE:167033}    {GO TO THE IMAGE SECTION AND DRAW YOUR COLPO FINDINGS UNDER THE IMAGES, DELETE THIS LINK AND TYPE (DOT) IMAGES TO INSERT THE FINDINGS}     POST PROCEDURE:                                                      IMPRESSION: {COLP IMPRESSION:340251}    PLAN : Await the results of the biopsies.  She {[facility] Tolerance:987239}. There were no complications. Patient was discharged in stable condition.    Patient advised to call the clinic if excessive bleeding, pelvic pain, or fever.     Follow-up {[facility] [place] followup :846475}.  Lazaro Beltran MD      "

## 2021-11-09 ENCOUNTER — OFFICE VISIT (OUTPATIENT)
Dept: OBGYN | Facility: CLINIC | Age: 61
End: 2021-11-09
Payer: COMMERCIAL

## 2021-11-09 VITALS
HEART RATE: 84 BPM | WEIGHT: 225 LBS | DIASTOLIC BLOOD PRESSURE: 74 MMHG | HEIGHT: 65 IN | BODY MASS INDEX: 37.49 KG/M2 | SYSTOLIC BLOOD PRESSURE: 136 MMHG

## 2021-11-09 DIAGNOSIS — R87.811 VAGINAL HIGH RISK HUMAN PAPILLOMAVIRUS (HPV) DNA TEST POSITIVE: Primary | ICD-10-CM

## 2021-11-09 PROCEDURE — 99213 OFFICE O/P EST LOW 20 MIN: CPT | Performed by: OBSTETRICS & GYNECOLOGY

## 2021-11-09 RX ORDER — METFORMIN HCL 500 MG
TABLET, EXTENDED RELEASE 24 HR ORAL
COMMUNITY
Start: 2021-09-11

## 2021-11-09 RX ORDER — ESTRADIOL 0.03 MG/D
PATCH TRANSDERMAL
COMMUNITY
Start: 2021-10-14

## 2021-11-09 ASSESSMENT — MIFFLIN-ST. JEOR: SCORE: 1581.47

## 2021-11-09 NOTE — PROGRESS NOTES
SUBJECTIVE:                                                   Anyi Recinos is a 61 year old female who presents to clinic today for the following health issue(s):  Patient presents with:  Follow Up: here to discuss recent paps and colpos      HPI: The patient is seen at this time for follow-up of high risk HPV infection.  She has a past history of a previous hysterectomy.  She also had mild dysplasia and HPV 18 identified and treated with laser to specifics sites in the vagina in January 2021.  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  She returned in June and then again in October.  The October colposcopy showed multiple acetowhite sites throughout the vaginal tract.  The patient was given the recommendation to return in 1 year for repeat colposcopy.  She has numerous questions about the treatment of high risk HPV infections.      Patient's last menstrual period was 09/17/2007..     Patient is sexually active, No obstetric history on file..  Using hysterectomy for contraception.    reports that she quit smoking about 39 years ago. She has a 3.00 pack-year smoking history. She has never used smokeless tobacco.    STD testing offered?  Declined    Health maintenance updated:  yes    Today's PHQ-2 Score:   PHQ-2 ( 1999 Pf[employer]) 11/9/2021   Q1: Little interest or pleasure in doing things 0   Q2: Feeling down, depressed or hopeless 0   PHQ-2 Score 0     Today's PHQ-9 Score:   PHQ-9 SCORE 11/25/2020   PHQ-9 Total Score 4     Today's HUEY-7 Score:   HUEY-7 SCORE 11/25/2020   Total Score 4       Problem list and histories reviewed & adjusted, as indicated.  Additional history: as documented.    Patient Active Problem List   Diagnosis     Prolapse of female pelvic organs     Cyst of left breast     Vaginal enterocele     Cystocele, midline     Routine general medical examination at a health care facility     Vaginal prolapse     Vaginal high risk human papillomavirus (HPV) DNA test  positive     Papanicolaou smear of vagina with atypical squamous cells cannot exclude high grade squamous intraepithelial lesion (ASC-H)     Vaginal dysplasia     Past Surgical History:   Procedure Laterality Date     COLPORRHAPHY ANTERIOR, POSTERIOR, COMBINED  2013    Procedure: COMBINED COLPORRHAPHY ANTERIOR, POSTERIOR;  ANTERIOR AND POSTERIOR REPAIR, PERINEOPLASTY, MINI ARC SLING (DR. STARK), DIAGNOSTIC LAPAROSCOPY AND LAPAROSCOPIC CHOLECYSTECTOMY (DR. LIU) ;  Surgeon: Augustus Stark MD;  Location:  OR     COSMETIC SURGERY      abdominoplasty     DAVINCI SACROCOLPOPEXY, MIDURETHRAL SLING, CYSTOSCOPY N/A 2016    Procedure: DAVINCI SACROCOLPOPEXY, MIDURETHRAL SLING, CYSTOSCOPY;  Surgeon: Don Abdullahi MD;  Location:  OR     GI SURGERY      lap band     GYN SURGERY      vag hysterectomy     GYN SURGERY  ,    c section     LAPAROSCOPIC CHOLECYSTECTOMY  2013    Procedure: LAPAROSCOPIC CHOLECYSTECTOMY;;  Surgeon: Mckinley Liu MD;  Location:  OR     LAPAROSCOPY DIAGNOSTIC (GENERAL)  2013    Procedure: LAPAROSCOPY DIAGNOSTIC (GENERAL);;  Surgeon: Mckinley Liu MD;  Location:  OR     LAPAROSCOPY, SURGICAL; REPAIR INCISIONAL OR VENTRAL HERNIA       LASER CO2 VAGINA N/A 2021    Procedure: TREATMENT, VAGINA, USING CO2 LASER;  Surgeon: Lazaro Beltran MD;  Location:  OR     MAMMOPLASTY REDUCTION BILATERAL       MASTOPEXY BILATERAL       SLING TRANSVAGINAL  2013    Procedure: SLING TRANSVAGINAL;;  Surgeon: Augustus Stark MD;  Location:  OR      Social History     Tobacco Use     Smoking status: Former Smoker     Packs/day: 0.50     Years: 6.00     Pack years: 3.00     Quit date: 1982     Years since quittin.8     Smokeless tobacco: Never Used   Substance Use Topics     Alcohol use: Yes     Comment: rare      Problem (# of Occurrences) Relation (Name,Age of Onset)    Gastrointestinal Disease (1) Father     "Heart Disease (1) Father    No Known Problems (7) Mother, Sister, Brother, Maternal Grandmother, Maternal Grandfather, Paternal Grandmother, Other            Current Outpatient Medications   Medication Sig     estradiol (ESTRACE) 0.5 MG tablet TAKE 1 TABLET(0.5 MG) BY MOUTH DAILY     estradiol (FEMPATCH) 0.025 MG/24HR weekly patch APPLY 1 PATCH TOPICALLY TO THE SKIN 1 TIME A WEEK     losartan (COZAAR) 50 MG tablet TK 1 T PO D     metFORMIN (GLUCOPHAGE-XR) 500 MG 24 hr tablet      Probiotic Product (PROBIOTIC PO) Take 1 tablet by mouth daily     No current facility-administered medications for this visit.     Allergies   Allergen Reactions     Lisinopril      cough       ROS:  12 point review of systems negative other than symptoms noted below or in the HPI.  No urinary frequency or dysuria, bladder or kidney problems      OBJECTIVE:     /74   Pulse 84   Ht 1.651 m (5' 5\")   Wt 102.1 kg (225 lb)   LMP 09/17/2007   BMI 37.44 kg/m    Body mass index is 37.44 kg/m .    Exam:  Constitutional:  Appearance: Well nourished, well developed alert, in no acute distress  Neurologic:  Mental Status:  Oriented X3.  Normal strength and tone, sensory exam grossly normal, mentation intact and speech normal.    Psychiatric:  Mentation appears normal and affect normal/bright.     In-Clinic Test Results:      ASSESSMENT/PLAN:                                                        ICD-10-CM    1. Vaginal high risk human papillomavirus (HPV) DNA test positive  R87.811        61-year-old patient well-known to me with high risk HPV infection (HPV 18) that has persisted.  She had treatment of multiple areas in January but has biopsy-proven recurrence at multiple sites in the vagina in October.  We discussed the options of further observation versus active management.  We discussed laser and 5-FU.  The patient should return at 6 months from her last colposcopy if she is going to take the expectant management route.  She fully " understands close follow-up and management of low-grade dysplasia is different from moderate to severe or precancerous states.        Lazaro Beltran MD  [facility] [place]

## 2021-12-29 ENCOUNTER — PATIENT OUTREACH (OUTPATIENT)
Dept: OBGYN | Facility: CLINIC | Age: 61
End: 2021-12-29
Payer: COMMERCIAL

## 2022-02-13 ENCOUNTER — HEALTH MAINTENANCE LETTER (OUTPATIENT)
Age: 62
End: 2022-02-13

## 2022-03-18 ENCOUNTER — PATIENT OUTREACH (OUTPATIENT)
Dept: OBGYN | Facility: CLINIC | Age: 62
End: 2022-03-18
Payer: COMMERCIAL

## 2022-03-18 DIAGNOSIS — R87.621 PAPANICOLAOU SMEAR OF VAGINA WITH ATYPICAL SQUAMOUS CELLS CANNOT EXCLUDE HIGH GRADE SQUAMOUS INTRAEPITHELIAL LESION (ASC-H): ICD-10-CM

## 2022-04-10 ENCOUNTER — HEALTH MAINTENANCE LETTER (OUTPATIENT)
Age: 62
End: 2022-04-10

## 2022-04-13 NOTE — TELEPHONE ENCOUNTER
Patient due for Pap and HPV.    Reminder done today via telephone call - patient notified. She states she has been seeing a provider at the [facility] and recently had a pap and colpo done. Also notes she had a mammogram done as well.    Encouraged to have records transferred to us if she would like us to have this on file for her.    End tracking.

## 2022-10-15 ENCOUNTER — HEALTH MAINTENANCE LETTER (OUTPATIENT)
Age: 62
End: 2022-10-15

## 2023-03-26 ENCOUNTER — HEALTH MAINTENANCE LETTER (OUTPATIENT)
Age: 63
End: 2023-03-26

## 2023-05-27 ENCOUNTER — HEALTH MAINTENANCE LETTER (OUTPATIENT)
Age: 63
End: 2023-05-27

## 2024-05-26 ENCOUNTER — HEALTH MAINTENANCE LETTER (OUTPATIENT)
Age: 64
End: 2024-05-26

## 2024-08-04 ENCOUNTER — HEALTH MAINTENANCE LETTER (OUTPATIENT)
Age: 64
End: 2024-08-04

## 2024-09-17 ENCOUNTER — LAB REQUISITION (OUTPATIENT)
Dept: LAB | Facility: CLINIC | Age: 64
End: 2024-09-17
Payer: MEDICARE

## 2024-09-17 DIAGNOSIS — H16.041 MARGINAL CORNEAL ULCER, RIGHT EYE: ICD-10-CM

## 2024-09-17 LAB
BACTERIA SPEC CULT: NORMAL
GRAM STAIN RESULT: NORMAL
GRAM STAIN RESULT: NORMAL

## 2024-09-17 PROCEDURE — 87205 SMEAR GRAM STAIN: CPT | Mod: ORL | Performed by: OPTOMETRIST

## 2024-09-17 PROCEDURE — 87102 FUNGUS ISOLATION CULTURE: CPT | Mod: ORL | Performed by: OPTOMETRIST

## 2024-09-17 PROCEDURE — 87077 CULTURE AEROBIC IDENTIFY: CPT | Mod: ORL | Performed by: OPTOMETRIST

## 2024-09-20 LAB — BACTERIA TISS BX CULT: ABNORMAL

## 2024-10-15 LAB — BACTERIA TISS BX CULT: NO GROWTH

## (undated) DEVICE — LINEN TOWEL PACK X5 5464

## (undated) DEVICE — GLOVE PROTEXIS W/NEU-THERA 7.5  2D73TE75

## (undated) DEVICE — SOL WATER IRRIG 1000ML BOTTLE 2F7114

## (undated) DEVICE — TUBING SUCTION MEDI-VAC SOFT 3/16"X20' N520A

## (undated) DEVICE — TUBE SMOKE EVAC 7/8"X10 (STERILE)

## (undated) DEVICE — SWAB PROCTO 16" NON-STERILE

## (undated) DEVICE — PACK TVT HYSTEROSCOPY SMA15HYFSE

## (undated) RX ORDER — KETOROLAC TROMETHAMINE 30 MG/ML
INJECTION, SOLUTION INTRAMUSCULAR; INTRAVENOUS
Status: DISPENSED
Start: 2021-01-28

## (undated) RX ORDER — LIDOCAINE HYDROCHLORIDE 20 MG/ML
INJECTION, SOLUTION EPIDURAL; INFILTRATION; INTRACAUDAL; PERINEURAL
Status: DISPENSED
Start: 2021-01-28

## (undated) RX ORDER — FENTANYL CITRATE 50 UG/ML
INJECTION, SOLUTION INTRAMUSCULAR; INTRAVENOUS
Status: DISPENSED
Start: 2021-01-28

## (undated) RX ORDER — DEXAMETHASONE SODIUM PHOSPHATE 4 MG/ML
INJECTION, SOLUTION INTRA-ARTICULAR; INTRALESIONAL; INTRAMUSCULAR; INTRAVENOUS; SOFT TISSUE
Status: DISPENSED
Start: 2021-01-28

## (undated) RX ORDER — ONDANSETRON 2 MG/ML
INJECTION INTRAMUSCULAR; INTRAVENOUS
Status: DISPENSED
Start: 2021-01-28